# Patient Record
Sex: FEMALE | Race: BLACK OR AFRICAN AMERICAN | Employment: UNEMPLOYED | URBAN - METROPOLITAN AREA
[De-identification: names, ages, dates, MRNs, and addresses within clinical notes are randomized per-mention and may not be internally consistent; named-entity substitution may affect disease eponyms.]

---

## 2019-07-03 ENCOUNTER — HOSPITAL ENCOUNTER (EMERGENCY)
Age: 19
Discharge: HOME | End: 2019-07-04
Attending: EMERGENCY MEDICINE
Payer: COMMERCIAL

## 2019-07-03 DIAGNOSIS — M54.50 ACUTE RIGHT-SIDED LOW BACK PAIN WITHOUT SCIATICA: Primary | ICD-10-CM

## 2019-07-03 DIAGNOSIS — Z3A.18 18 WEEKS GESTATION OF PREGNANCY: ICD-10-CM

## 2019-07-03 PROCEDURE — 83735 ASSAY OF MAGNESIUM: CPT

## 2019-07-03 PROCEDURE — 80053 COMPREHEN METABOLIC PANEL: CPT

## 2019-07-03 PROCEDURE — 81003 URINALYSIS AUTO W/O SCOPE: CPT

## 2019-07-03 PROCEDURE — 85025 COMPLETE CBC W/AUTO DIFF WBC: CPT

## 2019-07-03 PROCEDURE — 99283 EMERGENCY DEPT VISIT LOW MDM: CPT

## 2019-07-03 SDOH — HEALTH STABILITY: MENTAL HEALTH: HOW OFTEN DO YOU HAVE A DRINK CONTAINING ALCOHOL?: NEVER

## 2019-07-03 ASSESSMENT — PAIN SCALES - GENERAL: PAINLEVEL_OUTOF10: 7

## 2019-07-04 VITALS
OXYGEN SATURATION: 100 % | WEIGHT: 132 LBS | BODY MASS INDEX: 21.99 KG/M2 | HEIGHT: 65 IN | RESPIRATION RATE: 16 BRPM | SYSTOLIC BLOOD PRESSURE: 110 MMHG | DIASTOLIC BLOOD PRESSURE: 82 MMHG | TEMPERATURE: 98.1 F | HEART RATE: 75 BPM

## 2019-07-04 LAB
A/G RATIO: 1 (ref 1.1–2.2)
ALBUMIN SERPL-MCNC: 3.3 G/DL (ref 3.4–5)
ALP BLD-CCNC: 54 U/L (ref 40–129)
ALT SERPL-CCNC: 14 U/L (ref 10–40)
ANION GAP SERPL CALCULATED.3IONS-SCNC: 11 MMOL/L (ref 3–16)
AST SERPL-CCNC: 19 U/L (ref 15–37)
BASOPHILS ABSOLUTE: 0 K/UL (ref 0–0.2)
BASOPHILS RELATIVE PERCENT: 0.4 %
BILIRUB SERPL-MCNC: 0.5 MG/DL (ref 0–1)
BILIRUBIN URINE: NEGATIVE
BLOOD, URINE: NEGATIVE
BUN BLDV-MCNC: 7 MG/DL (ref 7–20)
CALCIUM SERPL-MCNC: 8.7 MG/DL (ref 8.3–10.6)
CHLORIDE BLD-SCNC: 104 MMOL/L (ref 99–110)
CLARITY: CLEAR
CO2: 22 MMOL/L (ref 21–32)
COLOR: YELLOW
CREAT SERPL-MCNC: 0.5 MG/DL (ref 0.6–1.1)
EOSINOPHILS ABSOLUTE: 0.1 K/UL (ref 0–0.6)
EOSINOPHILS RELATIVE PERCENT: 1.3 %
GFR AFRICAN AMERICAN: >60
GFR NON-AFRICAN AMERICAN: >60
GLOBULIN: 3.3 G/DL
GLUCOSE BLD-MCNC: 84 MG/DL (ref 70–99)
GLUCOSE URINE: NEGATIVE MG/DL
HCT VFR BLD CALC: 31 % (ref 36–48)
HEMOGLOBIN: 10.1 G/DL (ref 12–16)
KETONES, URINE: NEGATIVE MG/DL
LEUKOCYTE ESTERASE, URINE: NEGATIVE
LYMPHOCYTES ABSOLUTE: 1.9 K/UL (ref 1–5.1)
LYMPHOCYTES RELATIVE PERCENT: 26.4 %
MAGNESIUM: 1.8 MG/DL (ref 1.8–2.4)
MCH RBC QN AUTO: 23.8 PG (ref 26–34)
MCHC RBC AUTO-ENTMCNC: 32.6 G/DL (ref 31–36)
MCV RBC AUTO: 73 FL (ref 80–100)
MICROSCOPIC EXAMINATION: NORMAL
MONOCYTES ABSOLUTE: 0.6 K/UL (ref 0–1.3)
MONOCYTES RELATIVE PERCENT: 8.6 %
NEUTROPHILS ABSOLUTE: 4.6 K/UL (ref 1.7–7.7)
NEUTROPHILS RELATIVE PERCENT: 63.3 %
NITRITE, URINE: NEGATIVE
PDW BLD-RTO: 16.1 % (ref 12.4–15.4)
PH UA: 6.5 (ref 5–8)
PLATELET # BLD: 208 K/UL (ref 135–450)
PMV BLD AUTO: 9.3 FL (ref 5–10.5)
POTASSIUM REFLEX MAGNESIUM: 3.5 MMOL/L (ref 3.5–5.1)
PROTEIN UA: NEGATIVE MG/DL
RBC # BLD: 4.25 M/UL (ref 4–5.2)
SODIUM BLD-SCNC: 137 MMOL/L (ref 136–145)
SPECIFIC GRAVITY UA: <1.005 (ref 1–1.03)
TOTAL PROTEIN: 6.6 G/DL (ref 6.4–8.2)
URINE REFLEX TO CULTURE: NORMAL
URINE TYPE: NORMAL
UROBILINOGEN, URINE: 0.2 E.U./DL
WBC # BLD: 7.3 K/UL (ref 4–11)

## 2019-07-04 ASSESSMENT — ENCOUNTER SYMPTOMS
DIARRHEA: 0
SHORTNESS OF BREATH: 0
NAUSEA: 0
VOMITING: 0
BACK PAIN: 1
ABDOMINAL PAIN: 0

## 2019-07-04 NOTE — ED PROVIDER NOTES
[facility]        Pt Name: Bushra Noland  MRN: [id number]  [place] 2000  Date of evaluation: 7/3/2019  Provider: Ivonne Watts  PCP: Referring Not In System (Inactive)    This patient was seen and evaluated by the attending physician Endy Mary MD.      [address] [place] [address]       Chief Complaint   Patient presents with    Flank Pain     R sided flank pain that radiates to lower back for the past few hours, denies N/V/D or urinary symptoms, denies vaginal bleeding       HISTORY OF PRESENT ILLNESS   (Location/Symptom, Timing/Onset, Context/Setting, Quality, Duration, Modifying Factors, Severity)  Note limiting factors. Bushra Noland is a 22 y.o. female patient presents emergency department for evaluation of right lower back pain. Patient states this started approximately few hours ago. Patient also states she has some discomfort to her lower abdomen that goes from her left hip across to her right hip. Patient denies any nausea, vomiting, diarrhea or pain with urination. She denies any abnormal vaginal bleeding or discharge. Patient denies any fevers at home. Patient states she is approximately 18 weeks pregnant and does have positive fetal movement. Nursing Notes were all reviewed and agreed with or any disagreements were addressed  in the HPI. REVIEW OF SYSTEMS    (2-9 systems for level 4, 10 or more for level 5)     Review of Systems   Constitutional: Negative for fatigue and fever. HENT: Negative. Eyes: Negative for visual disturbance. Respiratory: Negative for shortness of breath. Cardiovascular: Negative for chest pain. Gastrointestinal: Negative for abdominal pain, diarrhea, nausea and vomiting. Genitourinary: Negative for dysuria. Musculoskeletal: Positive for back pain. Neurological: Negative for dizziness, light-headedness and headaches.        Positives and Pertinent negatives as per CULTURE    Narrative:     Performed at:  [facility]  [address],  [place], [facility]   Phone [phone]   MAGNESIUM    Narrative:     Performed at:  [facility]  [address], [address]   Phone [phone]       All other labs were within normal range or not returned as of this dictation. EKG: All EKG's are interpreted by the Emergency Department Physician in the absence of a cardiologist.  Please see their note for interpretation of EKG. RADIOLOGY:   Non-plain film images such as CT, Ultrasound and MRI are read by the radiologist. Plain radiographic images are visualized andpreliminarily interpreted by the  ED Provider with the below findings:        Interpretation perthe Radiologist below, if available at the time of this note:    No orders to display     No results found. PROCEDURES   Unless otherwise noted below, none     Procedures    CRITICAL CARE TIME   N/A    CONSULTS:  None      EMERGENCY DEPARTMENT COURSE and DIFFERENTIAL DIAGNOSIS/MDM:   Vitals:    Vitals:    07/03/19 2239 07/04/19 0100   BP: 124/79 110/82   Pulse: 100 75   Resp: 16 16   Temp: 98.1 °F (36.7 °C)    SpO2: 100% 100%   Weight: 132 lb (59.9 kg)    Height: 5' 5\" (1.651 m)        Patient was given thefollowing medications:  Medications - No data to display    Patient presents emergency department for evaluation of right lower back pain. Patient is alert and oriented no acute distress. Vitals are stable and she is afebrile. Patient has some tenderness to the right lower back musculature with palpation. Patient denies any saddle anesthesia, loss of bowel or bladder or numbness or tingling in her feet. Patient also has some discomfort noted to her extreme lower abdomen just below her developing gravid abdomen. There is no reproducible pain to palpation of her abdomen. Abdomen is soft and nontender. This is likely round ligament pain. Urinalysis shows no evidence of acute cystitis. Patient has no CVA tenderness. Patient's back pain is likely due to change in posture due to gravid abdomen. Patient was encouraged to use warm compresses as well as Tylenol for her discomfort. At this time I feel the patient is at low risk of intra-abdominal abnormality including appendicitis, ovarian torsion, vaginal infection, cauda equina, acute bony fracture or neurovascular injury. FINAL IMPRESSION      1.  Acute right-sided low back pain without sciatica    2. 18 weeks gestation of pregnancy          DISPOSITION/PLAN   DISPOSITION Decision To Discharge 07/04/2019 12:49:46 AM      PATIENT REFERREDTO:  [facility] Emergency Department  [address] [facility]  [address] [place] [address]  [phone]    If symptoms worsen      DISCHARGE MEDICATIONS:  Discharge Medication List as of 7/4/2019 12:57 AM          DISCONTINUED MEDICATIONS:  Discharge Medication List as of 7/4/2019 12:57 AM                 (Please note that portions ofthis note were completed with a voice recognition program.  Efforts were made to edit the dictations but occasionally words are mis-transcribed.)    Amelie Mason PA-C (electronically signed)            Amelie Mason PA-C  07/04/19 5141

## 2019-07-04 NOTE — ED NOTES
IV removed. VSS. Reviewed discharge instructions, home meds, and follow up care with patient, verbalized understanding.       Nolvia Santos RN  07/04/19 7444

## 2019-07-04 NOTE — ED NOTES
IV started without complications, pt tolerated well. Blood work collected and sent to lab. 22g in [facility]. UA collected and sent to lab. Fetal heart tones assessed at 153BPM. Pt denies further needs at this time, will continue to monitor.       Reece Ivy RN  07/04/19 0001

## 2019-08-12 LAB
GLUCOSE BLD-MCNC: 72 MG/DL (ref 70–99)
HCT VFR BLD CALC: 32.9 % (ref 36–48)
HEMOGLOBIN: 10.5 G/DL (ref 12–16)
MCH RBC QN AUTO: 23.9 PG (ref 26–34)
MCHC RBC AUTO-ENTMCNC: 31.8 G/DL (ref 31–36)
MCV RBC AUTO: 75 FL (ref 80–100)
PDW BLD-RTO: 16.1 % (ref 12.4–15.4)
PLATELET # BLD: 237 K/UL (ref 135–450)
PMV BLD AUTO: 8.8 FL (ref 5–10.5)
RBC # BLD: 4.39 M/UL (ref 4–5.2)
WBC # BLD: 7.5 K/UL (ref 4–11)

## 2019-08-21 ENCOUNTER — HOSPITAL ENCOUNTER (OUTPATIENT)
Age: 19
Setting detail: OBSERVATION
Discharge: HOME | End: 2019-08-21
Attending: OBSTETRICS & GYNECOLOGY | Admitting: OBSTETRICS & GYNECOLOGY
Payer: COMMERCIAL

## 2019-08-21 VITALS
HEART RATE: 88 BPM | DIASTOLIC BLOOD PRESSURE: 56 MMHG | TEMPERATURE: 97.9 F | SYSTOLIC BLOOD PRESSURE: 112 MMHG | RESPIRATION RATE: 16 BRPM

## 2019-08-21 LAB
BILIRUBIN URINE: NEGATIVE
BLOOD, URINE: NEGATIVE
CLARITY: CLEAR
COLOR: YELLOW
FETAL FIBRONECTIN: POSITIVE
GLUCOSE URINE: NEGATIVE MG/DL
KETONES, URINE: NEGATIVE MG/DL
LEUKOCYTE ESTERASE, URINE: NEGATIVE
MICROSCOPIC EXAMINATION: NORMAL
NITRITE, URINE: NEGATIVE
PH UA: 6.5 (ref 5–8)
PROTEIN UA: NEGATIVE MG/DL
SPECIFIC GRAVITY UA: 1.02 (ref 1–1.03)
URINE TYPE: NORMAL
UROBILINOGEN, URINE: 0.2 E.U./DL

## 2019-08-21 PROCEDURE — 81003 URINALYSIS AUTO W/O SCOPE: CPT

## 2019-08-21 PROCEDURE — G0378 HOSPITAL OBSERVATION PER HR: HCPCS

## 2019-08-21 PROCEDURE — G0379 DIRECT REFER HOSPITAL OBSERV: HCPCS

## 2019-08-21 PROCEDURE — 99211 OFF/OP EST MAY X REQ PHY/QHP: CPT

## 2019-08-21 NOTE — FLOWSHEET NOTE
Patient to triage for contractions. Denies bleeding or leaking fluid. Reports good fetal movement. EFM and contraction monitor applied. UA obtained.

## 2019-08-21 NOTE — FLOWSHEET NOTE
Dr. Amelie Borja notified of patient's [address]. Ordered bedside ultrasound and she will come evaluate.

## 2019-08-28 ENCOUNTER — HOSPITAL ENCOUNTER (OUTPATIENT)
Age: 19
Setting detail: SPECIMEN
Discharge: HOME | End: 2019-08-28
Payer: COMMERCIAL

## 2019-08-28 PROBLEM — O26.892 PELVIC PAIN AFFECTING PREGNANCY IN SECOND TRIMESTER, ANTEPARTUM: Status: ACTIVE | Noted: 2019-08-28

## 2019-08-28 PROBLEM — R10.2 PELVIC PAIN AFFECTING PREGNANCY IN SECOND TRIMESTER, ANTEPARTUM: Status: ACTIVE | Noted: 2019-08-28

## 2019-08-28 LAB
FETAL FIBRONECTIN: NEGATIVE
HEPATITIS C ANTIBODY INTERPRETATION: NORMAL
RUBELLA ANTIBODY IGG: 327.1 IU/ML

## 2019-08-28 PROCEDURE — 82731 ASSAY OF FETAL FIBRONECTIN: CPT

## 2019-08-29 LAB — TOTAL SYPHILLIS IGG/IGM: NORMAL

## 2019-09-28 LAB
HEPATITIS C ANTIBODY, EXTERNAL RESULT: NORMAL
RUBELLA TITER, EXTERNAL RESULT: 327.1

## 2019-11-02 LAB — GBS, EXTERNAL RESULT: NEGATIVE

## 2019-11-18 ENCOUNTER — HOSPITAL ENCOUNTER (OUTPATIENT)
Age: 19
Discharge: HOME | End: 2019-11-18
Attending: OBSTETRICS & GYNECOLOGY | Admitting: OBSTETRICS & GYNECOLOGY
Payer: MEDICARE

## 2019-11-18 VITALS — RESPIRATION RATE: 18 BRPM | WEIGHT: 160 LBS | HEIGHT: 65 IN | BODY MASS INDEX: 26.66 KG/M2

## 2019-11-18 PROBLEM — O47.9 THREATENED LABOR: Status: ACTIVE | Noted: 2019-11-18

## 2019-11-18 PROCEDURE — 99211 OFF/OP EST MAY X REQ PHY/QHP: CPT

## 2019-11-19 PROCEDURE — 99211 OFF/OP EST MAY X REQ PHY/QHP: CPT

## 2019-11-20 ENCOUNTER — ANESTHESIA EVENT (OUTPATIENT)
Dept: LABOR AND DELIVERY | Age: 19
End: 2019-11-20

## 2019-11-20 ENCOUNTER — ANESTHESIA (OUTPATIENT)
Dept: LABOR AND DELIVERY | Age: 19
End: 2019-11-20

## 2019-11-20 ENCOUNTER — HOSPITAL ENCOUNTER (INPATIENT)
Age: 19
LOS: 2 days | Discharge: HOME | End: 2019-11-22
Attending: OBSTETRICS & GYNECOLOGY | Admitting: OBSTETRICS & GYNECOLOGY
Payer: COMMERCIAL

## 2019-11-20 LAB
AMPHETAMINE SCREEN, URINE: NORMAL
BARBITURATE SCREEN URINE: NORMAL
BENZODIAZEPINE SCREEN, URINE: NORMAL
BUPRENORPHINE URINE: NORMAL
CANNABINOID SCREEN URINE: NORMAL
COCAINE METABOLITE SCREEN URINE: NORMAL
HCT VFR BLD CALC: 38.3 % (ref 36–48)
HEMOGLOBIN: 12.5 G/DL (ref 12–16)
Lab: NORMAL
MCH RBC QN AUTO: 25 PG (ref 26–34)
MCHC RBC AUTO-ENTMCNC: 32.6 G/DL (ref 31–36)
MCV RBC AUTO: 76.7 FL (ref 80–100)
METHADONE SCREEN, URINE: NORMAL
OPIATE SCREEN URINE: NORMAL
OXYCODONE URINE: NORMAL
PDW BLD-RTO: 16.2 % (ref 12.4–15.4)
PH UA: 7
PHENCYCLIDINE SCREEN URINE: NORMAL
PLATELET # BLD: 197 K/UL (ref 135–450)
PMV BLD AUTO: 9.9 FL (ref 5–10.5)
PROPOXYPHENE SCREEN: NORMAL
RBC # BLD: 4.99 M/UL (ref 4–5.2)
SPECIMEN STATUS: NORMAL
WBC # BLD: 11 K/UL (ref 4–11)

## 2019-11-20 PROCEDURE — 0UQMXZZ REPAIR VULVA, EXTERNAL APPROACH: ICD-10-PCS | Performed by: OBSTETRICS & GYNECOLOGY

## 2019-11-20 PROCEDURE — 85027 COMPLETE CBC AUTOMATED: CPT

## 2019-11-20 PROCEDURE — 6360000002 HC RX W HCPCS: Performed by: OBSTETRICS & GYNECOLOGY

## 2019-11-20 PROCEDURE — 80307 DRUG TEST PRSMV CHEM ANLYZR: CPT

## 2019-11-20 PROCEDURE — 0HQ9XZZ REPAIR PERINEUM SKIN, EXTERNAL APPROACH: ICD-10-PCS | Performed by: OBSTETRICS & GYNECOLOGY

## 2019-11-20 PROCEDURE — 1220000000 HC SEMI PRIVATE OB R&B

## 2019-11-20 PROCEDURE — 2580000003 HC RX 258: Performed by: OBSTETRICS & GYNECOLOGY

## 2019-11-20 PROCEDURE — 10907ZC DRAINAGE OF AMNIOTIC FLUID, THERAPEUTIC FROM PRODUCTS OF CONCEPTION, VIA NATURAL OR ARTIFICIAL OPENING: ICD-10-PCS | Performed by: OBSTETRICS & GYNECOLOGY

## 2019-11-20 PROCEDURE — 7200000001 HC VAGINAL DELIVERY

## 2019-11-20 PROCEDURE — 86780 TREPONEMA PALLIDUM: CPT

## 2019-11-20 RX ORDER — ACYCLOVIR 200 MG/1
200 CAPSULE ORAL PRN
COMMUNITY

## 2019-11-20 RX ORDER — LIDOCAINE HYDROCHLORIDE 10 MG/ML
30 INJECTION, SOLUTION EPIDURAL; INFILTRATION; INTRACAUDAL; PERINEURAL PRN
Status: DISCONTINUED | OUTPATIENT
Start: 2019-11-20 | End: 2019-11-21

## 2019-11-20 RX ORDER — SODIUM CHLORIDE 0.9 % (FLUSH) 0.9 %
10 SYRINGE (ML) INJECTION EVERY 12 HOURS SCHEDULED
Status: DISCONTINUED | OUTPATIENT
Start: 2019-11-20 | End: 2019-11-21

## 2019-11-20 RX ORDER — OXYCODONE HYDROCHLORIDE 5 MG/1
5 TABLET ORAL EVERY 6 HOURS PRN
Qty: 12 TABLET | Refills: 0 | Status: SHIPPED | OUTPATIENT
Start: 2019-11-20 | End: 2019-11-23

## 2019-11-20 RX ORDER — ONDANSETRON 2 MG/ML
4 INJECTION INTRAMUSCULAR; INTRAVENOUS EVERY 6 HOURS PRN
Status: DISCONTINUED | OUTPATIENT
Start: 2019-11-20 | End: 2019-11-21

## 2019-11-20 RX ORDER — IBUPROFEN 800 MG/1
800 TABLET ORAL EVERY 6 HOURS PRN
Qty: 40 TABLET | Refills: 0 | Status: SHIPPED | OUTPATIENT
Start: 2019-11-20 | End: 2020-08-19

## 2019-11-20 RX ORDER — ACETAMINOPHEN 500 MG
1000 TABLET ORAL EVERY 6 HOURS PRN
Qty: 30 TABLET | Refills: 0 | Status: SHIPPED | OUTPATIENT
Start: 2019-11-20 | End: 2020-08-19

## 2019-11-20 RX ORDER — SODIUM CHLORIDE, SODIUM LACTATE, POTASSIUM CHLORIDE, CALCIUM CHLORIDE 600; 310; 30; 20 MG/100ML; MG/100ML; MG/100ML; MG/100ML
INJECTION, SOLUTION INTRAVENOUS CONTINUOUS
Status: DISCONTINUED | OUTPATIENT
Start: 2019-11-20 | End: 2019-11-21

## 2019-11-20 RX ORDER — ERYTHROMYCIN 5 MG/G
OINTMENT OPHTHALMIC
Status: DISCONTINUED
Start: 2019-11-20 | End: 2019-11-21

## 2019-11-20 RX ORDER — TERBUTALINE SULFATE 1 MG/ML
0.25 INJECTION, SOLUTION SUBCUTANEOUS ONCE
Status: DISCONTINUED | OUTPATIENT
Start: 2019-11-20 | End: 2019-11-21

## 2019-11-20 RX ORDER — SODIUM CHLORIDE 0.9 % (FLUSH) 0.9 %
10 SYRINGE (ML) INJECTION PRN
Status: DISCONTINUED | OUTPATIENT
Start: 2019-11-20 | End: 2019-11-21

## 2019-11-20 RX ORDER — DOCUSATE SODIUM 100 MG/1
100 CAPSULE, LIQUID FILLED ORAL 2 TIMES DAILY
Status: DISCONTINUED | OUTPATIENT
Start: 2019-11-20 | End: 2019-11-21

## 2019-11-20 RX ADMIN — SODIUM CHLORIDE, POTASSIUM CHLORIDE, SODIUM LACTATE AND CALCIUM CHLORIDE: 600; 310; 30; 20 INJECTION, SOLUTION INTRAVENOUS at 19:35

## 2019-11-20 RX ADMIN — Medication 1 MILLI-UNITS/MIN: at 23:33

## 2019-11-20 ASSESSMENT — PAIN DESCRIPTION - DESCRIPTORS
DESCRIPTORS: CRAMPING
DESCRIPTORS: CRAMPING

## 2019-11-21 LAB — TOTAL SYPHILLIS IGG/IGM: NORMAL

## 2019-11-21 PROCEDURE — 90471 IMMUNIZATION ADMIN: CPT | Performed by: OBSTETRICS & GYNECOLOGY

## 2019-11-21 PROCEDURE — 6360000002 HC RX W HCPCS: Performed by: OBSTETRICS & GYNECOLOGY

## 2019-11-21 PROCEDURE — 6370000000 HC RX 637 (ALT 250 FOR IP)

## 2019-11-21 PROCEDURE — 1200000000 HC SEMI PRIVATE

## 2019-11-21 PROCEDURE — 90715 TDAP VACCINE 7 YRS/> IM: CPT | Performed by: OBSTETRICS & GYNECOLOGY

## 2019-11-21 PROCEDURE — 6370000000 HC RX 637 (ALT 250 FOR IP): Performed by: OBSTETRICS & GYNECOLOGY

## 2019-11-21 RX ORDER — OXYCODONE HYDROCHLORIDE AND ACETAMINOPHEN 5; 325 MG/1; MG/1
1 TABLET ORAL EVERY 4 HOURS PRN
Status: DISCONTINUED | OUTPATIENT
Start: 2019-11-21 | End: 2019-11-22 | Stop reason: HOSPADM

## 2019-11-21 RX ORDER — SIMETHICONE 80 MG
80 TABLET,CHEWABLE ORAL EVERY 6 HOURS PRN
Status: DISCONTINUED | OUTPATIENT
Start: 2019-11-21 | End: 2019-11-22 | Stop reason: HOSPADM

## 2019-11-21 RX ORDER — ONDANSETRON 2 MG/ML
4 INJECTION INTRAMUSCULAR; INTRAVENOUS EVERY 6 HOURS PRN
Status: DISCONTINUED | OUTPATIENT
Start: 2019-11-21 | End: 2019-11-22 | Stop reason: HOSPADM

## 2019-11-21 RX ORDER — DOCUSATE SODIUM 100 MG/1
100 CAPSULE, LIQUID FILLED ORAL 2 TIMES DAILY
Status: DISCONTINUED | OUTPATIENT
Start: 2019-11-21 | End: 2019-11-22 | Stop reason: HOSPADM

## 2019-11-21 RX ORDER — ACETAMINOPHEN 325 MG/1
650 TABLET ORAL EVERY 4 HOURS PRN
Status: DISCONTINUED | OUTPATIENT
Start: 2019-11-21 | End: 2019-11-22 | Stop reason: HOSPADM

## 2019-11-21 RX ORDER — FERROUS SULFATE 325(65) MG
325 TABLET ORAL DAILY
Status: DISCONTINUED | OUTPATIENT
Start: 2019-11-21 | End: 2019-11-22 | Stop reason: HOSPADM

## 2019-11-21 RX ORDER — IBUPROFEN 600 MG/1
600 TABLET ORAL EVERY 6 HOURS SCHEDULED
Status: DISCONTINUED | OUTPATIENT
Start: 2019-11-21 | End: 2019-11-22 | Stop reason: HOSPADM

## 2019-11-21 RX ORDER — ONDANSETRON 4 MG/1
4 TABLET, ORALLY DISINTEGRATING ORAL EVERY 6 HOURS PRN
Status: DISCONTINUED | OUTPATIENT
Start: 2019-11-21 | End: 2019-11-22 | Stop reason: HOSPADM

## 2019-11-21 RX ORDER — CARBOPROST TROMETHAMINE 250 UG/ML
250 INJECTION, SOLUTION INTRAMUSCULAR
Status: ACTIVE | OUTPATIENT
Start: 2019-11-21 | End: 2019-11-21

## 2019-11-21 RX ORDER — SODIUM CHLORIDE 0.9 % (FLUSH) 0.9 %
10 SYRINGE (ML) INJECTION EVERY 12 HOURS SCHEDULED
Status: DISCONTINUED | OUTPATIENT
Start: 2019-11-21 | End: 2019-11-22 | Stop reason: HOSPADM

## 2019-11-21 RX ORDER — METHYLERGONOVINE MALEATE 0.2 MG/ML
200 INJECTION INTRAVENOUS
Status: ACTIVE | OUTPATIENT
Start: 2019-11-21 | End: 2019-11-21

## 2019-11-21 RX ORDER — OXYCODONE HYDROCHLORIDE AND ACETAMINOPHEN 5; 325 MG/1; MG/1
2 TABLET ORAL EVERY 4 HOURS PRN
Status: DISCONTINUED | OUTPATIENT
Start: 2019-11-21 | End: 2019-11-22 | Stop reason: HOSPADM

## 2019-11-21 RX ORDER — SODIUM CHLORIDE 0.9 % (FLUSH) 0.9 %
10 SYRINGE (ML) INJECTION PRN
Status: DISCONTINUED | OUTPATIENT
Start: 2019-11-21 | End: 2019-11-22 | Stop reason: HOSPADM

## 2019-11-21 RX ORDER — FAMOTIDINE 20 MG/1
20 TABLET, FILM COATED ORAL 2 TIMES DAILY PRN
Status: DISCONTINUED | OUTPATIENT
Start: 2019-11-21 | End: 2019-11-22 | Stop reason: HOSPADM

## 2019-11-21 RX ORDER — SENNA AND DOCUSATE SODIUM 50; 8.6 MG/1; MG/1
1 TABLET, FILM COATED ORAL DAILY PRN
Status: DISCONTINUED | OUTPATIENT
Start: 2019-11-21 | End: 2019-11-22 | Stop reason: HOSPADM

## 2019-11-21 RX ORDER — LANOLIN 100 %
OINTMENT (GRAM) TOPICAL PRN
Status: DISCONTINUED | OUTPATIENT
Start: 2019-11-21 | End: 2019-11-22 | Stop reason: HOSPADM

## 2019-11-21 RX ORDER — MISOPROSTOL 100 UG/1
800 TABLET ORAL PRN
Status: DISCONTINUED | OUTPATIENT
Start: 2019-11-21 | End: 2019-11-22 | Stop reason: HOSPADM

## 2019-11-21 RX ADMIN — IBUPROFEN 600 MG: 600 TABLET, FILM COATED ORAL at 22:59

## 2019-11-21 RX ADMIN — DOCUSATE SODIUM 100 MG: 100 CAPSULE ORAL at 09:46

## 2019-11-21 RX ADMIN — ACETAMINOPHEN 650 MG: 325 TABLET, FILM COATED ORAL at 06:17

## 2019-11-21 RX ADMIN — DOCUSATE SODIUM 100 MG: 100 CAPSULE ORAL at 20:34

## 2019-11-21 RX ADMIN — IBUPROFEN 600 MG: 600 TABLET, FILM COATED ORAL at 17:31

## 2019-11-21 RX ADMIN — ACETAMINOPHEN 650 MG: 325 TABLET, FILM COATED ORAL at 23:07

## 2019-11-21 RX ADMIN — TETANUS TOXOID, REDUCED DIPHTHERIA TOXOID AND ACELLULAR PERTUSSIS VACCINE, ADSORBED 0.5 ML: 5; 2.5; 8; 8; 2.5 SUSPENSION INTRAMUSCULAR at 23:00

## 2019-11-21 RX ADMIN — BENZOCAINE AND LEVOMENTHOL: 200; 5 SPRAY TOPICAL at 01:28

## 2019-11-21 RX ADMIN — IBUPROFEN 600 MG: 600 TABLET, FILM COATED ORAL at 02:50

## 2019-11-21 RX ADMIN — IBUPROFEN 600 MG: 600 TABLET, FILM COATED ORAL at 09:46

## 2019-11-21 ASSESSMENT — PAIN SCALES - GENERAL
PAINLEVEL_OUTOF10: 5
PAINLEVEL_OUTOF10: 0
PAINLEVEL_OUTOF10: 3

## 2019-11-22 VITALS
RESPIRATION RATE: 18 BRPM | TEMPERATURE: 97.3 F | HEART RATE: 77 BPM | DIASTOLIC BLOOD PRESSURE: 57 MMHG | SYSTOLIC BLOOD PRESSURE: 105 MMHG

## 2019-11-22 PROCEDURE — 6370000000 HC RX 637 (ALT 250 FOR IP): Performed by: OBSTETRICS & GYNECOLOGY

## 2019-11-22 RX ADMIN — DOCUSATE SODIUM 100 MG: 100 CAPSULE ORAL at 09:03

## 2019-11-22 RX ADMIN — IBUPROFEN 600 MG: 600 TABLET, FILM COATED ORAL at 04:48

## 2019-11-22 RX ADMIN — IBUPROFEN 600 MG: 600 TABLET, FILM COATED ORAL at 12:47

## 2019-11-22 RX ADMIN — ACETAMINOPHEN 650 MG: 325 TABLET, FILM COATED ORAL at 10:10

## 2019-11-22 ASSESSMENT — PAIN SCALES - GENERAL
PAINLEVEL_OUTOF10: 5
PAINLEVEL_OUTOF10: 4
PAINLEVEL_OUTOF10: 4

## 2020-07-06 ENCOUNTER — TELEPHONE (OUTPATIENT)
Dept: FAMILY MEDICINE CLINIC | Age: 20
End: 2020-07-06

## 2020-07-09 ENCOUNTER — APPOINTMENT (OUTPATIENT)
Dept: CT IMAGING | Age: 20
End: 2020-07-09
Payer: COMMERCIAL

## 2020-07-09 ENCOUNTER — HOSPITAL ENCOUNTER (EMERGENCY)
Age: 20
Discharge: HOME | End: 2020-07-09
Payer: COMMERCIAL

## 2020-07-09 ENCOUNTER — APPOINTMENT (OUTPATIENT)
Dept: GENERAL RADIOLOGY | Age: 20
End: 2020-07-09
Payer: COMMERCIAL

## 2020-07-09 VITALS
RESPIRATION RATE: 18 BRPM | HEART RATE: 84 BPM | BODY MASS INDEX: 21.49 KG/M2 | WEIGHT: 128.97 LBS | HEIGHT: 65 IN | OXYGEN SATURATION: 100 % | TEMPERATURE: 98.8 F

## 2020-07-09 PROCEDURE — 70486 CT MAXILLOFACIAL W/O DYE: CPT

## 2020-07-09 PROCEDURE — 99284 EMERGENCY DEPT VISIT MOD MDM: CPT

## 2020-07-09 PROCEDURE — 70450 CT HEAD/BRAIN W/O DYE: CPT

## 2020-07-09 PROCEDURE — 70160 X-RAY EXAM OF NASAL BONES: CPT

## 2020-07-09 ASSESSMENT — PAIN DESCRIPTION - LOCATION
LOCATION: FACE

## 2020-07-09 ASSESSMENT — PAIN DESCRIPTION - PROGRESSION
CLINICAL_PROGRESSION: NOT CHANGED

## 2020-07-09 ASSESSMENT — PAIN DESCRIPTION - FREQUENCY
FREQUENCY: CONTINUOUS

## 2020-07-09 ASSESSMENT — PAIN DESCRIPTION - PAIN TYPE
TYPE: ACUTE PAIN

## 2020-07-09 ASSESSMENT — PAIN SCALES - GENERAL
PAINLEVEL_OUTOF10: 3

## 2020-07-09 ASSESSMENT — PAIN DESCRIPTION - DESCRIPTORS
DESCRIPTORS: ACHING

## 2020-07-09 ASSESSMENT — PAIN DESCRIPTION - ORIENTATION
ORIENTATION: ANTERIOR

## 2020-07-09 ASSESSMENT — PAIN DESCRIPTION - ONSET
ONSET: ON-GOING

## 2020-07-09 ASSESSMENT — PAIN - FUNCTIONAL ASSESSMENT
PAIN_FUNCTIONAL_ASSESSMENT: ACTIVITIES ARE NOT PREVENTED
PAIN_FUNCTIONAL_ASSESSMENT: ACTIVITIES ARE NOT PREVENTED
PAIN_FUNCTIONAL_ASSESSMENT: PREVENTS OR INTERFERES SOME ACTIVE ACTIVITIES AND ADLS

## 2020-07-09 NOTE — LETTER
[facility] Emergency Department  [address] [facility] [address]  Phone: [phone]               July 9, 2020    Patient: Diomedes Morales   YOB: 2000   Date of Visit: 7/9/2020       To Whom It May Concern:    Diomedes Morales was seen and treated in our emergency department on 7/9/2020. She may return to work on 7/10/2020.       Sincerely,       Juan Bentley RN         Signature:__________________________________

## 2020-07-09 NOTE — ED PROVIDER NOTES
[address] [facility]  [address] [address]  Dept: [phone]  Loc: [address] ENCOUNTER        This patient was not seen or evaluated by the attending physician. I evaluated this patient, the attending physician was available for consultation. CHIEF COMPLAINT    Chief Complaint   Patient presents with    Fall     pt tripped over son. pt it face on hard wood floor yesterday. no meds at home.  Otalgia     right ear; x 1 week. HPI    Huseyin Ketih is a 20 y.o. female who presents emergency department today with a one-week complaint of right ear pain. She states it is draining and crusting. It is painful to the touch. She denies body aches, fever, chills, headache, sore throat or cough. She states also yesterday she tripped over her son and hit her face onto the floor. She did not have loss of consciousness. She did not have any visual disturbances. She denies neck pain or any other injuries. She woke up today and she noticed some bruising beneath both of her eyes and her nasal bridge is a little swollen. She denies any lacerations or nose bleeding after the fall yesterday. REVIEW OF SYSTEMS    Neurologic: see HPI, No extremity weakness, no LOC  Musculoskeletal: see HPI  Cardiac: No chest pain or chest injury  Respiratory: No difficulty breathing  GI: No abdominal pain or abdominal injury  : No dysuria or hematuria  General: No fever  All other systems reviewed and are negative. PAST MEDICAL & SURGICAL HISTORY    Past Medical History:   Diagnosis Date    Anemia     Herpes simplex virus (HSV) infection     HSV (culture +) @ 27 wks      History reviewed. No pertinent surgical history.     CURRENT MEDICATIONS  (may include discharge medications prescribed in the ED)  Current Outpatient Rx   Medication Sig Dispense Refill    neomycin-polymyxin-hydrocortisone (CORTISPORIN) 3.5-32638-8 otic solution Place 4 drops into the right ear 3 times daily for 10 days Instill into right Ear 1 Bottle 0    acyclovir (ZOVIRAX) 200 MG capsule Take 200 mg by mouth daily      ibuprofen (ADVIL;MOTRIN) 800 MG tablet Take 1 tablet by mouth every 6 hours as needed for Pain 40 tablet 0    acetaminophen (AMINOFEN) 500 MG tablet Take 2 tablets by mouth every 6 hours as needed for Pain 30 tablet 0    Prenatal MV-Min-Fe Fum-FA-DHA (PRENATAL 1 PO) Take by mouth      ferrous sulfate (FLORIAN-DOMINGO) 325 (65 FE) MG tablet Take 1 tablet by mouth daily (with breakfast) 30 tablet 0       ALLERGIES    No Known Allergies    SOCIAL & FAMILY HISTORY    Social History     Socioeconomic History    Marital status: Single     Spouse name: None    Number of children: None    Years of education: None    Highest education level: None   Occupational History    None   Social Needs    Financial resource strain: None    Food insecurity     Worry: None     Inability: None    Transportation needs     Medical: None     Non-medical: None   Tobacco Use    Smoking status: Never Smoker    Smokeless tobacco: Never Used   Substance and Sexual Activity    Alcohol use: Not Currently     Frequency: Never    Drug use: Not Currently     Types: Marijuana    Sexual activity: Yes   Lifestyle    Physical activity     Days per week: None     Minutes per session: None    Stress: None   Relationships    Social connections     Talks on phone: None     Gets together: None     Attends Church service: None     Active member of club or organization: None     Attends meetings of clubs or organizations: None     Relationship status: None    Intimate partner violence     Fear of current or ex partner: None     Emotionally abused: None     Physically abused: None     Forced sexual activity: None   Other Topics Concern    None   Social History Narrative    None     Family History   Problem Relation Age of Onset    Anemia Mother    Marisa [place] Arrhythmia Mother     Early Death Mother     Heart Attack Mother     Heart Disease Mother     High Blood Pressure Mother     Kidney Disease Mother     Obesity Mother     Obesity Sister     Breast Cancer Paternal Aunt     Cancer Paternal Uncle     Early Death Maternal Grandmother        PHYSICAL EXAM    VITAL SIGNS: Pulse 84   Temp 98.8 °F (37.1 °C) (Oral)   Resp 18   Ht 5' 5\" (1.651 m)   Wt 128 lb 15.5 oz (58.5 kg)   SpO2 100%   BMI 21.46 kg/m²    Constitutional:  Well developed, well nourished, no acute distress   Scalp: no scalp hematoma, no palpable skull fractures  Neck: no posterior midline neck tenderness, no JVD   Eyes: Pupils equally round and react to light, extraocular movement are intact  HENT:  No trismus, airway patent, no hemotympanum. Nasal bridge has mild amount of soft tissue swelling with purple ecchymosis. The nasal bridge is straight. No septal hematoma or dried blood noted in the nares. The purple ecchymosis extends below both eyes. There is no soft tissue swelling to any other parts of the face. She has no pain or obvious deformity or crepitance with palpation to the orbital rims. Right ear exam is very painful. The tragus is mildly edematous and is painful with palpation. There is yellow crust in the ear canal.  No sign of perforation to the tympanic membrane. Neck is supple and non-meningismus without lymphadenopathy. Oropharynx is unremarkable.   Respiratory:  Lungs clear to auscultation bilaterally, no retractions   Cardiovascular:  regular rate, no murmurs  GI:  Soft, nontender, normal bowel sounds  Musculoskeletal:  no edema, no acute deformities  Vascular: Radial and DP pulses 2+ and equal bilaterally  Integument:  Well hydrated, no scalp laceration  Neurologic:  Awake, alert, oriented x4, no aphasia, no slurred speech, CN II-XII intact, normal finger to nose test bilaterally, 5/5 strength in all 4 extremities, sensation to light touch intact bilaterally, patellar reflexes 2+ and equal bilaterally  Psych: Pleasant affect, no hallucinations    RADIOLOGY    XR NASAL BONE (MIN 3 VIEWS )   Final Result   Right nasal bone fracture seen on CT today is not appreciated by this   modality. CT HEAD WO CONTRAST   Final Result   No acute intracranial abnormality. Right mastoid effusion         CT FACIAL BONES WO CONTRAST   Final Result   Minimally displaced nasal bone fracture. ED COURSE & MEDICAL DECISION MAKING    Pertinent Labs & Imaging studies reviewed and interpreted. (See chart for details)  Medications - No data to display    I have seen and evaluated this patient. My attending physician was available for consultation. Differential Diagnosis: epidural hematoma, subdural hematoma, parenchymal brain contusion or bleed, subarachnoid hemorrhage, skull fracture, neck fracture or dislocation, other. Neuro exam unremarkable. CT of the head and facial bones without contrast is interpreted by radiology show a minimally displaced nasal bone fracture and a right mastoid effusion. No acute intracranial abnormality. There are no lacerations noted to the nasal bridge. She is instructed on ice packs to the area but no longer than 15 minutes each time. She can follow-up with ENT. She will be placed on polymyxin eardrops and she was instructed to follow-up with her primary care physician in the next couple of days. She was instructed to return to the emergency department otherwise for worsening symptoms. Patient verbalized understanding of the discharge instructions. FINAL IMPRESSION    1. Acute swimmer's ear of right side    2.  Closed fracture of nasal bone, initial encounter        PLAN   Discharge with outpatient follow-up (see EMR)    (Please note that this note was completed with a voice recognition program.  Every attempt was made to edit the dictations, but inevitably there remain words that are mis-transcribed.)       Delmi Espinoza, FAISAL - CNP  07/10/20 8971

## 2020-07-09 NOTE — ED NOTES
Discharge and education instructions reviewed. Patient verbalized understanding, teach-back successful. Patient denied questions at this time. No acute distress noted. Patient instructed to follow-up as noted - return to emergency department if symptoms worsen. Patient verbalized understanding. Discharged per EDMD with discharged instructions.        Rogerio Grewal RN  07/09/20 0825

## 2020-07-16 ENCOUNTER — APPOINTMENT (OUTPATIENT)
Dept: GENERAL RADIOLOGY | Age: 20
End: 2020-07-16
Payer: COMMERCIAL

## 2020-07-16 ENCOUNTER — HOSPITAL ENCOUNTER (EMERGENCY)
Age: 20
Discharge: HOME | End: 2020-07-16
Payer: COMMERCIAL

## 2020-07-16 VITALS
WEIGHT: 126 LBS | HEART RATE: 78 BPM | BODY MASS INDEX: 20.99 KG/M2 | TEMPERATURE: 98.8 F | SYSTOLIC BLOOD PRESSURE: 133 MMHG | DIASTOLIC BLOOD PRESSURE: 84 MMHG | HEIGHT: 65 IN | OXYGEN SATURATION: 99 % | RESPIRATION RATE: 14 BRPM

## 2020-07-16 PROCEDURE — 73110 X-RAY EXAM OF WRIST: CPT

## 2020-07-16 PROCEDURE — 6370000000 HC RX 637 (ALT 250 FOR IP): Performed by: PHYSICIAN ASSISTANT

## 2020-07-16 PROCEDURE — 99283 EMERGENCY DEPT VISIT LOW MDM: CPT

## 2020-07-16 PROCEDURE — 73130 X-RAY EXAM OF HAND: CPT

## 2020-07-16 RX ORDER — NAPROXEN 500 MG/1
500 TABLET ORAL 2 TIMES DAILY WITH MEALS
Qty: 30 TABLET | Refills: 0 | Status: SHIPPED | OUTPATIENT
Start: 2020-07-16 | End: 2020-08-20

## 2020-07-16 RX ORDER — NAPROXEN 250 MG/1
500 TABLET ORAL ONCE
Status: COMPLETED | OUTPATIENT
Start: 2020-07-16 | End: 2020-07-16

## 2020-07-16 RX ORDER — HYDROCODONE BITARTRATE AND ACETAMINOPHEN 5; 325 MG/1; MG/1
2 TABLET ORAL ONCE
Status: COMPLETED | OUTPATIENT
Start: 2020-07-16 | End: 2020-07-16

## 2020-07-16 RX ADMIN — NAPROXEN 500 MG: 250 TABLET ORAL at 07:59

## 2020-07-16 RX ADMIN — HYDROCODONE BITARTRATE AND ACETAMINOPHEN 2 TABLET: 5; 325 TABLET ORAL at 07:59

## 2020-07-16 ASSESSMENT — PAIN DESCRIPTION - ORIENTATION: ORIENTATION: RIGHT

## 2020-07-16 ASSESSMENT — ENCOUNTER SYMPTOMS
NAUSEA: 0
COUGH: 0
SHORTNESS OF BREATH: 0
VOMITING: 0
ABDOMINAL PAIN: 0
CONSTIPATION: 0
RESPIRATORY NEGATIVE: 1
BACK PAIN: 0
COLOR CHANGE: 0
DIARRHEA: 0

## 2020-07-16 ASSESSMENT — PAIN SCALES - GENERAL: PAINLEVEL_OUTOF10: 9

## 2020-07-16 ASSESSMENT — PAIN DESCRIPTION - PAIN TYPE: TYPE: ACUTE PAIN

## 2020-07-16 ASSESSMENT — PAIN DESCRIPTION - LOCATION: LOCATION: HAND

## 2020-07-16 ASSESSMENT — PAIN DESCRIPTION - DESCRIPTORS: DESCRIPTORS: ACHING
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
## 2020-08-19 ENCOUNTER — APPOINTMENT (OUTPATIENT)
Dept: GENERAL RADIOLOGY | Age: 20
End: 2020-08-19
Payer: COMMERCIAL

## 2020-08-19 ENCOUNTER — HOSPITAL ENCOUNTER (EMERGENCY)
Age: 20
Discharge: HOME | End: 2020-08-19
Payer: COMMERCIAL

## 2020-08-19 VITALS
TEMPERATURE: 98.1 F | OXYGEN SATURATION: 98 % | BODY MASS INDEX: 20.09 KG/M2 | HEART RATE: 62 BPM | WEIGHT: 120.59 LBS | DIASTOLIC BLOOD PRESSURE: 86 MMHG | SYSTOLIC BLOOD PRESSURE: 126 MMHG | RESPIRATION RATE: 14 BRPM | HEIGHT: 65 IN

## 2020-08-19 LAB — HCG(URINE) PREGNANCY TEST: NEGATIVE

## 2020-08-19 PROCEDURE — 84703 CHORIONIC GONADOTROPIN ASSAY: CPT

## 2020-08-19 PROCEDURE — 6370000000 HC RX 637 (ALT 250 FOR IP): Performed by: PHYSICIAN ASSISTANT

## 2020-08-19 PROCEDURE — 29125 APPL SHORT ARM SPLINT STATIC: CPT

## 2020-08-19 PROCEDURE — 29105 APPLICATION LONG ARM SPLINT: CPT

## 2020-08-19 PROCEDURE — 99284 EMERGENCY DEPT VISIT MOD MDM: CPT | Performed by: ORTHOPAEDIC SURGERY

## 2020-08-19 PROCEDURE — 73110 X-RAY EXAM OF WRIST: CPT

## 2020-08-19 PROCEDURE — 72072 X-RAY EXAM THORAC SPINE 3VWS: CPT

## 2020-08-19 PROCEDURE — 72100 X-RAY EXAM L-S SPINE 2/3 VWS: CPT

## 2020-08-19 PROCEDURE — 73502 X-RAY EXAM HIP UNI 2-3 VIEWS: CPT

## 2020-08-19 PROCEDURE — 70110 X-RAY EXAM OF JAW 4/> VIEWS: CPT

## 2020-08-19 PROCEDURE — U0003 INFECTIOUS AGENT DETECTION BY NUCLEIC ACID (DNA OR RNA); SEVERE ACUTE RESPIRATORY SYNDROME CORONAVIRUS 2 (SARS-COV-2) (CORONAVIRUS DISEASE [COVID-19]), AMPLIFIED PROBE TECHNIQUE, MAKING USE OF HIGH THROUGHPUT TECHNOLOGIES AS DESCRIBED BY CMS-2020-01-R: HCPCS

## 2020-08-19 PROCEDURE — 99284 EMERGENCY DEPT VISIT MOD MDM: CPT

## 2020-08-19 RX ORDER — ACETAMINOPHEN 500 MG
1000 TABLET ORAL ONCE
Status: COMPLETED | OUTPATIENT
Start: 2020-08-19 | End: 2020-08-19

## 2020-08-19 RX ORDER — IBUPROFEN 600 MG/1
600 TABLET ORAL EVERY 6 HOURS PRN
Qty: 30 TABLET | Refills: 0 | Status: SHIPPED | OUTPATIENT
Start: 2020-08-19

## 2020-08-19 RX ORDER — ACETAMINOPHEN 500 MG
1000 TABLET ORAL EVERY 6 HOURS PRN
Qty: 40 TABLET | Refills: 0 | Status: SHIPPED | OUTPATIENT
Start: 2020-08-19

## 2020-08-19 RX ADMIN — IBUPROFEN 600 MG: 400 TABLET, FILM COATED ORAL at 10:12

## 2020-08-19 RX ADMIN — ACETAMINOPHEN 1000 MG: 500 TABLET, FILM COATED ORAL at 10:12

## 2020-08-19 ASSESSMENT — PAIN DESCRIPTION - ORIENTATION: ORIENTATION: RIGHT

## 2020-08-19 ASSESSMENT — ENCOUNTER SYMPTOMS
COUGH: 0
ABDOMINAL PAIN: 0
FACIAL SWELLING: 1
VOMITING: 0
NAUSEA: 0
SORE THROAT: 0
BACK PAIN: 1
SHORTNESS OF BREATH: 0

## 2020-08-19 ASSESSMENT — PAIN SCALES - GENERAL
PAINLEVEL_OUTOF10: 7

## 2020-08-19 ASSESSMENT — PAIN DESCRIPTION - PAIN TYPE: TYPE: ACUTE PAIN

## 2020-08-19 NOTE — CARE COORDINATION
SW consult:  SW met with patient and discussed if she would like a representative from [facility]) to respond to the ED and meet with her. Patient in agreement with [facility] coming to see her. SW called [facility], they will have a representative respond to [facility] within 45 minutes. SW available as needed.     12:30pm  [facility] rep called- she should arrive at Hospital around 1pm.

## 2020-08-19 NOTE — ED NOTES
Patient back from Xray and 2 family members were in the room. This RN in to ask the female visitor to leave since the man had been in prior. When this RN said she had to leave the female said \"it's all good he's leaving\"  This RN stated its 1 visitor per day with COVID and since the male hade already been in he was the only one aloud to stay. Female stated \"fuck no, he needs to go. \"  This RN told both visitors they needed to leave the room. The female yelled at the patient and asked why he was even here.   The patient stated \"I don't know\"     Kesha Pedroza, RN  08/19/20 2722

## 2020-08-19 NOTE — LETTER
[facility] Emergency Department  [address] [facility] [address]  Phone: [phone]               August 31, 2020    Patient: Sp Pedroza   YOB: 2000   Date of Visit: 8/19/2020       To Whom It May Concern:    Sp Pedroza was seen and treated in our emergency department on 8/19/2020.  She was accompanied by Mario Acosta.      Sincerely,                Signature:__________________________________

## 2020-08-19 NOTE — ED NOTES
Discharge and education instructions reviewed. Patient verbalized understanding, teach-back successful. Patient denied questions at this time. No acute distress noted. Patient instructed to follow-up as noted - return to emergency department if symptoms worsen. Patient verbalized understanding. Discharged per EDMD with discharge instructions.           Loletta Poster, FABY  08/19/20 2650

## 2020-08-19 NOTE — ED NOTES
Patient requested this RN get her boyfriend Rosa Queen from the lobby.   Rosa Queen back to the room     Sonam Hernandez RN  08/19/20 4250
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
## 2020-08-19 NOTE — ED NOTES
Sugar tong OCL placed to left arm. +PMS noted prior to and after splint placement. Medium sling applied to left arm as well.       Marizol Delgado  08/19/20 0137

## 2020-08-20 ENCOUNTER — TELEPHONE (OUTPATIENT)
Dept: ORTHOPEDIC SURGERY | Age: 20
End: 2020-08-20

## 2020-08-20 LAB — SARS-COV-2, NAA: NOT DETECTED

## 2020-08-20 RX ORDER — HYDROCODONE BITARTRATE AND ACETAMINOPHEN 5; 325 MG/1; MG/1
1 TABLET ORAL EVERY 6 HOURS PRN
Qty: 20 TABLET | Refills: 0 | Status: SHIPPED | OUTPATIENT
Start: 2020-08-20 | End: 2020-08-25

## 2020-08-20 NOTE — TELEPHONE ENCOUNTER
Patient called stating someone from Dr. Alvarenga Ice office called her but did not leave a message.  # [phone]

## 2020-08-20 NOTE — TELEPHONE ENCOUNTER
While discussing surgery details, pt states she is in a lot of pain. The tylenol and ibuprofen she was taking is not helping. Pt scheduled for 8.24.20 ulna fx. pls call [phone]

## 2020-08-20 NOTE — PROGRESS NOTES
C-Difficile admission screening and protocol:     * Admitted with diarrhea? n     *Prior history of C-Diff. In last 3 months?n     *Antibiotic use in the past 6-8 weeks?n     *Prior hospitalization or nursing home in the last month?n        [address] time_____615_______        Surgery time____________    Take the following medications with a sip of water:    Do not eat or drink anything after 12:00 midnight prior to your surgery. This includes water chewing gum, mints and ice chips. You may brush your teeth and gargle the morning of your surgery, but do not swallow the water     Please see your family doctor/pediatrician for a history and physical and/or concerning medications. Bring any test results/reports from your physicians office. If you are under the care of a heart doctor or specialist doctor, please be aware that you may be asked to them for clearance    You may be asked to stop blood thinners such as Coumadin, Plavix, Fragmin, Lovenox, etc., or any anti-inflammatories such as:  Aspirin, Ibuprofen, Advil, Naproxen prior to your surgery. We also ask that you stop any OTC medications such as fish oil, vitamin E, glucosamine, garlic, Multivitamins, COQ 10, etc.    We ask that you do not smoke 24 hours prior to surgery  We ask that you do not  drink any alcoholic beverages 24 hours prior to surgery     You must make arrangements for a responsible adult to take you home after your surgery. For your safety you will not be allowed to leave alone or drive yourself home. Your surgery will be cancelled if you do not have a ride home. Also for your safety, it is strongly suggested that someone stay with you the first 24 hours after your surgery. A parent or legal guardian must accompany a child scheduled for surgery and plan to stay at the hospital until the child is discharged. Please do not bring other children with you.     For your comfort, please wear simple loose fitting clothing to the hospital.  Please do not bring valuables. Do not wear any make-up or nail polish on your fingers or toes      For your safety, please do not wear any jewelry or body piercing's on the day of surgery. All jewelry must be removed. If you have dentures, they will be removed before going to operating room. For your convenience, we will provide you with a container. If you wear contact lenses or glasses, they will be removed, please bring a case for them. If you have a living will and a durable power of  for healthcare, please bring in a copy. As part of our patient safety program to minimize surgical site infections, we ask you to do the following:    · Please notify your surgeon if you develop any illness between         now and the  day of your surgery. · This includes a cough, cold, fever, sore throat, nausea,         or vomiting, and diarrhea, etc.  ·  Please notify your surgeon if you experience dizziness, shortness         of breath or blurred vision between now and the time of your surgery. Do not shave your operative site 96 hours prior to surgery. For face and neck surgery, men may use an electric razor 48 hours   prior to surgery. You may shower the night before surgery or the morning of   your surgery with an antibacterial soap. You will need to bring a photo ID and insurance card    [facility] has an onsite pharmacy, would you like to utilize our pharmacy     If you will be staying overnight and use a C-pap machine, please bring   your C-pap to hospital     Our goal is to provide you with excellent care, therefore, visitors will be limited to two(2) in the room at a time so that we may focus on providing this care for you. Please contact pre-admission testing if you have any further questions.                  [facility] phone number:  [phone] PAT fax number:  [phone]  Please note these are generalized instructions for all surgical cases, you may be provided with more specific instructions according to your surgery.

## 2020-08-20 NOTE — PROGRESS NOTES
Preoperative Screening for Elective Surgery/Invasive Procedures While COVID-19 present in the community     Have you tested positive or have been told to self-isolate for COVID-19 like symptoms within the past 28 days? n   Do you currently have any of the following symptoms? n  o Fever >100.0 F or 99.9 F in immunocompromised patients?n  o New onset cough, shortness of breath or difficulty breathing?n  o New onset sore throat, myalgia (muscle aches and pains), headache, loss of taste/smell or diarrhea?n   Have you had a potential exposure to COVID-19 within the past 14 days by:  o Close contact with a confirmed case?n  o Close contact with a healthcare worker,  or essential infrastructure worker (grocery store, [employer], gas station, public utilities or transportation)? YES  o Do you reside in a congregate setting such as; skilled nursing facility, adult home, correctional facility, homeless shelter or other institutional setting? no  o Have you had recent travel to a known COVID-19 hotspot? Pt resides [facility]    Indicate if the patient has a positive screen by answering yes to one or more of the above questions. Patients who test positive or screen positive prior to surgery or on the day of surgery should be evaluated in conjunction with the surgeon/proceduralist/anesthesiologist to determine the urgency of the procedure.

## 2020-08-21 ENCOUNTER — ANESTHESIA EVENT (OUTPATIENT)
Dept: OPERATING ROOM | Age: 20
End: 2020-08-21
Payer: COMMERCIAL

## 2020-08-24 ENCOUNTER — APPOINTMENT (OUTPATIENT)
Dept: GENERAL RADIOLOGY | Age: 20
End: 2020-08-24
Attending: ORTHOPAEDIC SURGERY
Payer: COMMERCIAL

## 2020-08-24 ENCOUNTER — HOSPITAL ENCOUNTER (OUTPATIENT)
Age: 20
Setting detail: OUTPATIENT SURGERY
Discharge: HOME | End: 2020-08-24
Attending: ORTHOPAEDIC SURGERY | Admitting: ORTHOPAEDIC SURGERY
Payer: COMMERCIAL

## 2020-08-24 ENCOUNTER — ANESTHESIA (OUTPATIENT)
Dept: OPERATING ROOM | Age: 20
End: 2020-08-24
Payer: COMMERCIAL

## 2020-08-24 VITALS
RESPIRATION RATE: 16 BRPM | DIASTOLIC BLOOD PRESSURE: 76 MMHG | SYSTOLIC BLOOD PRESSURE: 117 MMHG | HEIGHT: 65 IN | BODY MASS INDEX: 19.83 KG/M2 | WEIGHT: 119.05 LBS | TEMPERATURE: 97 F | HEART RATE: 56 BPM | OXYGEN SATURATION: 99 %

## 2020-08-24 VITALS
DIASTOLIC BLOOD PRESSURE: 79 MMHG | RESPIRATION RATE: 6 BRPM | TEMPERATURE: 94.6 F | SYSTOLIC BLOOD PRESSURE: 133 MMHG | OXYGEN SATURATION: 100 %

## 2020-08-24 LAB — PREGNANCY, URINE: NEGATIVE

## 2020-08-24 PROCEDURE — 3209999900 FLUORO FOR SURGICAL PROCEDURES

## 2020-08-24 PROCEDURE — 2709999900 HC NON-CHARGEABLE SUPPLY: Performed by: ORTHOPAEDIC SURGERY

## 2020-08-24 PROCEDURE — C1713 ANCHOR/SCREW BN/BN,TIS/BN: HCPCS | Performed by: ORTHOPAEDIC SURGERY

## 2020-08-24 PROCEDURE — 2580000003 HC RX 258: Performed by: ANESTHESIOLOGY

## 2020-08-24 PROCEDURE — 73090 X-RAY EXAM OF FOREARM: CPT

## 2020-08-24 PROCEDURE — 3600000014 HC SURGERY LEVEL 4 ADDTL 15MIN: Performed by: ORTHOPAEDIC SURGERY

## 2020-08-24 PROCEDURE — 84703 CHORIONIC GONADOTROPIN ASSAY: CPT

## 2020-08-24 PROCEDURE — 6360000002 HC RX W HCPCS: Performed by: ANESTHESIOLOGY

## 2020-08-24 PROCEDURE — 2720000010 HC SURG SUPPLY STERILE: Performed by: ORTHOPAEDIC SURGERY

## 2020-08-24 PROCEDURE — 7100000000 HC PACU RECOVERY - FIRST 15 MIN: Performed by: ORTHOPAEDIC SURGERY

## 2020-08-24 PROCEDURE — 7100000011 HC PHASE II RECOVERY - ADDTL 15 MIN: Performed by: ORTHOPAEDIC SURGERY

## 2020-08-24 PROCEDURE — 3600000004 HC SURGERY LEVEL 4 BASE: Performed by: ORTHOPAEDIC SURGERY

## 2020-08-24 PROCEDURE — 3700000000 HC ANESTHESIA ATTENDED CARE: Performed by: ORTHOPAEDIC SURGERY

## 2020-08-24 PROCEDURE — 6360000002 HC RX W HCPCS: Performed by: ORTHOPAEDIC SURGERY

## 2020-08-24 PROCEDURE — 2500000003 HC RX 250 WO HCPCS

## 2020-08-24 PROCEDURE — 2500000003 HC RX 250 WO HCPCS: Performed by: ORTHOPAEDIC SURGERY

## 2020-08-24 PROCEDURE — 6370000000 HC RX 637 (ALT 250 FOR IP): Performed by: ANESTHESIOLOGY

## 2020-08-24 PROCEDURE — 2580000003 HC RX 258: Performed by: ORTHOPAEDIC SURGERY

## 2020-08-24 PROCEDURE — 3700000001 HC ADD 15 MINUTES (ANESTHESIA): Performed by: ORTHOPAEDIC SURGERY

## 2020-08-24 PROCEDURE — 25545 OPTX ULNAR SHFT FX INT FIXJ: CPT | Performed by: NURSE PRACTITIONER

## 2020-08-24 PROCEDURE — 25545 OPTX ULNAR SHFT FX INT FIXJ: CPT | Performed by: ORTHOPAEDIC SURGERY

## 2020-08-24 PROCEDURE — C1769 GUIDE WIRE: HCPCS | Performed by: ORTHOPAEDIC SURGERY

## 2020-08-24 PROCEDURE — 6360000002 HC RX W HCPCS

## 2020-08-24 PROCEDURE — 7100000010 HC PHASE II RECOVERY - FIRST 15 MIN: Performed by: ORTHOPAEDIC SURGERY

## 2020-08-24 PROCEDURE — 7100000001 HC PACU RECOVERY - ADDTL 15 MIN: Performed by: ORTHOPAEDIC SURGERY

## 2020-08-24 DEVICE — BONE SCREW, FULLY THREADED, T8
Type: IMPLANTABLE DEVICE | Site: ULNA | Status: FUNCTIONAL
Brand: VARIAX

## 2020-08-24 DEVICE — BROAD LOCKING PLATE, 2.7
Type: IMPLANTABLE DEVICE | Site: ULNA | Status: FUNCTIONAL
Brand: VARIAX

## 2020-08-24 DEVICE — BONE SCREW, T8
Type: IMPLANTABLE DEVICE | Site: ULNA | Status: FUNCTIONAL
Brand: VARIAX

## 2020-08-24 RX ORDER — FENTANYL CITRATE 50 UG/ML
INJECTION, SOLUTION INTRAMUSCULAR; INTRAVENOUS PRN
Status: DISCONTINUED | OUTPATIENT
Start: 2020-08-24 | End: 2020-08-24 | Stop reason: SDUPTHER

## 2020-08-24 RX ORDER — DEXAMETHASONE SODIUM PHOSPHATE 4 MG/ML
INJECTION, SOLUTION INTRA-ARTICULAR; INTRALESIONAL; INTRAMUSCULAR; INTRAVENOUS; SOFT TISSUE PRN
Status: DISCONTINUED | OUTPATIENT
Start: 2020-08-24 | End: 2020-08-24 | Stop reason: SDUPTHER

## 2020-08-24 RX ORDER — OXYCODONE HYDROCHLORIDE AND ACETAMINOPHEN 5; 325 MG/1; MG/1
2 TABLET ORAL PRN
Status: COMPLETED | OUTPATIENT
Start: 2020-08-24 | End: 2020-08-24

## 2020-08-24 RX ORDER — LIDOCAINE HYDROCHLORIDE 20 MG/ML
INJECTION, SOLUTION EPIDURAL; INFILTRATION; INTRACAUDAL; PERINEURAL PRN
Status: DISCONTINUED | OUTPATIENT
Start: 2020-08-24 | End: 2020-08-24 | Stop reason: SDUPTHER

## 2020-08-24 RX ORDER — FENTANYL CITRATE 50 UG/ML
25 INJECTION, SOLUTION INTRAMUSCULAR; INTRAVENOUS EVERY 5 MIN PRN
Status: DISCONTINUED | OUTPATIENT
Start: 2020-08-24 | End: 2020-08-24 | Stop reason: HOSPADM

## 2020-08-24 RX ORDER — ONDANSETRON 2 MG/ML
4 INJECTION INTRAMUSCULAR; INTRAVENOUS
Status: COMPLETED | OUTPATIENT
Start: 2020-08-24 | End: 2020-08-24

## 2020-08-24 RX ORDER — MAGNESIUM HYDROXIDE 1200 MG/15ML
LIQUID ORAL CONTINUOUS PRN
Status: COMPLETED | OUTPATIENT
Start: 2020-08-24 | End: 2020-08-24

## 2020-08-24 RX ORDER — SODIUM CHLORIDE 0.9 % (FLUSH) 0.9 %
10 SYRINGE (ML) INJECTION EVERY 12 HOURS SCHEDULED
Status: DISCONTINUED | OUTPATIENT
Start: 2020-08-24 | End: 2020-08-24 | Stop reason: HOSPADM

## 2020-08-24 RX ORDER — SODIUM CHLORIDE 0.9 % (FLUSH) 0.9 %
10 SYRINGE (ML) INJECTION PRN
Status: DISCONTINUED | OUTPATIENT
Start: 2020-08-24 | End: 2020-08-24 | Stop reason: HOSPADM

## 2020-08-24 RX ORDER — ONDANSETRON 2 MG/ML
INJECTION INTRAMUSCULAR; INTRAVENOUS PRN
Status: DISCONTINUED | OUTPATIENT
Start: 2020-08-24 | End: 2020-08-24 | Stop reason: SDUPTHER

## 2020-08-24 RX ORDER — CEPHALEXIN 500 MG/1
500 CAPSULE ORAL 4 TIMES DAILY
Qty: 20 CAPSULE | Refills: 0 | Status: SHIPPED | OUTPATIENT
Start: 2020-08-24 | End: 2020-08-29

## 2020-08-24 RX ORDER — OXYCODONE HYDROCHLORIDE AND ACETAMINOPHEN 5; 325 MG/1; MG/1
1 TABLET ORAL PRN
Status: COMPLETED | OUTPATIENT
Start: 2020-08-24 | End: 2020-08-24

## 2020-08-24 RX ORDER — PROPOFOL 10 MG/ML
INJECTION, EMULSION INTRAVENOUS PRN
Status: DISCONTINUED | OUTPATIENT
Start: 2020-08-24 | End: 2020-08-24 | Stop reason: SDUPTHER

## 2020-08-24 RX ORDER — MIDAZOLAM HYDROCHLORIDE 1 MG/ML
INJECTION INTRAMUSCULAR; INTRAVENOUS PRN
Status: DISCONTINUED | OUTPATIENT
Start: 2020-08-24 | End: 2020-08-24 | Stop reason: SDUPTHER

## 2020-08-24 RX ORDER — SODIUM CHLORIDE 9 MG/ML
INJECTION, SOLUTION INTRAVENOUS CONTINUOUS
Status: DISCONTINUED | OUTPATIENT
Start: 2020-08-24 | End: 2020-08-24 | Stop reason: HOSPADM

## 2020-08-24 RX ORDER — BUPIVACAINE HYDROCHLORIDE 5 MG/ML
INJECTION, SOLUTION EPIDURAL; INTRACAUDAL
Status: COMPLETED | OUTPATIENT
Start: 2020-08-24 | End: 2020-08-24

## 2020-08-24 RX ADMIN — OXYCODONE HYDROCHLORIDE AND ACETAMINOPHEN 1 TABLET: 5; 325 TABLET ORAL at 13:10

## 2020-08-24 RX ADMIN — PROPOFOL 200 MG: 10 INJECTION, EMULSION INTRAVENOUS at 08:07

## 2020-08-24 RX ADMIN — HYDROMORPHONE HYDROCHLORIDE 0.5 MG: 1 INJECTION, SOLUTION INTRAMUSCULAR; INTRAVENOUS; SUBCUTANEOUS at 09:31

## 2020-08-24 RX ADMIN — LIDOCAINE HYDROCHLORIDE 80 MG: 20 INJECTION, SOLUTION EPIDURAL; INFILTRATION; INTRACAUDAL; PERINEURAL at 08:06

## 2020-08-24 RX ADMIN — MIDAZOLAM 2 MG: 1 INJECTION INTRAMUSCULAR; INTRAVENOUS at 08:03

## 2020-08-24 RX ADMIN — CEFAZOLIN SODIUM 2 G: 10 INJECTION, POWDER, FOR SOLUTION INTRAVENOUS at 08:03

## 2020-08-24 RX ADMIN — PROPOFOL 20 MG: 10 INJECTION, EMULSION INTRAVENOUS at 09:12

## 2020-08-24 RX ADMIN — FENTANYL CITRATE 100 MCG: 50 INJECTION INTRAMUSCULAR; INTRAVENOUS at 08:06

## 2020-08-24 RX ADMIN — ONDANSETRON 4 MG: 2 INJECTION INTRAMUSCULAR; INTRAVENOUS at 08:48

## 2020-08-24 RX ADMIN — PROPOFOL 50 MG: 10 INJECTION, EMULSION INTRAVENOUS at 09:09

## 2020-08-24 RX ADMIN — SODIUM CHLORIDE: 9 INJECTION, SOLUTION INTRAVENOUS at 07:13

## 2020-08-24 RX ADMIN — PROPOFOL 30 MG: 10 INJECTION, EMULSION INTRAVENOUS at 09:02

## 2020-08-24 RX ADMIN — DEXAMETHASONE SODIUM PHOSPHATE 8 MG: 4 INJECTION, SOLUTION INTRAMUSCULAR; INTRAVENOUS at 08:16

## 2020-08-24 RX ADMIN — HYDROMORPHONE HYDROCHLORIDE 0.5 MG: 1 INJECTION, SOLUTION INTRAMUSCULAR; INTRAVENOUS; SUBCUTANEOUS at 08:56

## 2020-08-24 RX ADMIN — ONDANSETRON 4 MG: 2 INJECTION INTRAMUSCULAR; INTRAVENOUS at 12:35

## 2020-08-24 ASSESSMENT — PAIN - FUNCTIONAL ASSESSMENT
PAIN_FUNCTIONAL_ASSESSMENT: 0-10
PAIN_FUNCTIONAL_ASSESSMENT: ACTIVITIES ARE NOT PREVENTED
PAIN_FUNCTIONAL_ASSESSMENT: ACTIVITIES ARE NOT PREVENTED
PAIN_FUNCTIONAL_ASSESSMENT: PREVENTS OR INTERFERES SOME ACTIVE ACTIVITIES AND ADLS
PAIN_FUNCTIONAL_ASSESSMENT: ACTIVITIES ARE NOT PREVENTED
PAIN_FUNCTIONAL_ASSESSMENT: ACTIVITIES ARE NOT PREVENTED
PAIN_FUNCTIONAL_ASSESSMENT: PREVENTS OR INTERFERES SOME ACTIVE ACTIVITIES AND ADLS

## 2020-08-24 ASSESSMENT — PULMONARY FUNCTION TESTS
PIF_VALUE: 0
PIF_VALUE: 2
PIF_VALUE: 15
PIF_VALUE: 12
PIF_VALUE: 2
PIF_VALUE: 6
PIF_VALUE: 1
PIF_VALUE: 1
PIF_VALUE: 13
PIF_VALUE: 17
PIF_VALUE: 6
PIF_VALUE: 2
PIF_VALUE: 7
PIF_VALUE: 2
PIF_VALUE: 12
PIF_VALUE: 16
PIF_VALUE: 15
PIF_VALUE: 16
PIF_VALUE: 8
PIF_VALUE: 2
PIF_VALUE: 15
PIF_VALUE: 1
PIF_VALUE: 7
PIF_VALUE: 0
PIF_VALUE: 15
PIF_VALUE: 1
PIF_VALUE: 16
PIF_VALUE: 15
PIF_VALUE: 13
PIF_VALUE: 16
PIF_VALUE: 13
PIF_VALUE: 12
PIF_VALUE: 16
PIF_VALUE: 5
PIF_VALUE: 1
PIF_VALUE: 16
PIF_VALUE: 13
PIF_VALUE: 2
PIF_VALUE: 0
PIF_VALUE: 1
PIF_VALUE: 13
PIF_VALUE: 7
PIF_VALUE: 5
PIF_VALUE: 6
PIF_VALUE: 13
PIF_VALUE: 6
PIF_VALUE: 16
PIF_VALUE: 14
PIF_VALUE: 5
PIF_VALUE: 1
PIF_VALUE: 13
PIF_VALUE: 16
PIF_VALUE: 13
PIF_VALUE: 2
PIF_VALUE: 0
PIF_VALUE: 2
PIF_VALUE: 13
PIF_VALUE: 19
PIF_VALUE: 13
PIF_VALUE: 12

## 2020-08-24 ASSESSMENT — PAIN DESCRIPTION - PAIN TYPE
TYPE: SURGICAL PAIN
TYPE: ACUTE PAIN
TYPE: SURGICAL PAIN

## 2020-08-24 ASSESSMENT — PAIN DESCRIPTION - DESCRIPTORS
DESCRIPTORS: DISCOMFORT
DESCRIPTORS: SHARP
DESCRIPTORS: DISCOMFORT
DESCRIPTORS: DISCOMFORT
DESCRIPTORS: SHARP
DESCRIPTORS: DISCOMFORT

## 2020-08-24 ASSESSMENT — PAIN DESCRIPTION - FREQUENCY
FREQUENCY: CONTINUOUS

## 2020-08-24 ASSESSMENT — PAIN DESCRIPTION - ORIENTATION
ORIENTATION: LEFT

## 2020-08-24 ASSESSMENT — PAIN DESCRIPTION - ONSET
ONSET: ON-GOING

## 2020-08-24 ASSESSMENT — PAIN DESCRIPTION - LOCATION
LOCATION: ARM

## 2020-08-24 ASSESSMENT — PAIN DESCRIPTION - PROGRESSION
CLINICAL_PROGRESSION: GRADUALLY IMPROVING
CLINICAL_PROGRESSION: NOT CHANGED

## 2020-08-24 ASSESSMENT — PAIN SCALES - GENERAL
PAINLEVEL_OUTOF10: 10
PAINLEVEL_OUTOF10: 10
PAINLEVEL_OUTOF10: 6
PAINLEVEL_OUTOF10: 10
PAINLEVEL_OUTOF10: 6

## 2020-08-24 ASSESSMENT — LIFESTYLE VARIABLES: SMOKING_STATUS: 0

## 2020-08-24 ASSESSMENT — ENCOUNTER SYMPTOMS: SHORTNESS OF BREATH: 0

## 2020-08-24 NOTE — PROGRESS NOTES
Awakens to voice, c/o pain, ok with transfer and po medicine, appears to rest/sleep when undisturbed
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
## 2020-08-24 NOTE — PROGRESS NOTES
When awakened she said she is nauseated. zofran given. Up in chair, sleeping. Oriented when awakened.

## 2020-08-24 NOTE — ANESTHESIA POSTPROCEDURE EVALUATION
Department of Anesthesiology  Postprocedure Note    Patient: Raymond Brito  MRN: [id number]  YOB: 2000  Date of evaluation: 8/24/2020  Time:  11:09 AM     Procedure Summary     Date:  08/24/20 Room / Location:  [address]    Anesthesia Start:  0803 Anesthesia Stop:  2392    Procedure:  OPEN REDUCTION INTERNAL FIXATION LEFT ULNA SHAFT FRACTURE (Left Arm Lower) Diagnosis:  (LEFT ULNA SHAFT FRACTURE)    Surgeon:  Hunter Mcclure MD Responsible Provider:  Claudeen Donovan, MD    Anesthesia Type:  general ASA Status:  2          Anesthesia Type: general    Roxana Phase I: Roxana Score: 9    Roxana Phase II:      Last vitals: Reviewed and per EMR flowsheets.        Anesthesia Post Evaluation    Patient location during evaluation: PACU  Patient participation: complete - patient participated  Level of consciousness: awake and alert  Airway patency: patent  Nausea & Vomiting: no nausea and no vomiting  Complications: no  Cardiovascular status: hemodynamically stable  Respiratory status: acceptable  Hydration status: stable

## 2020-08-24 NOTE — PROGRESS NOTES
Says pain pill is helping pain. Encouraged to go home and go to bed. She said she would. verbalized understanding of discharge instructions.
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
## 2020-08-24 NOTE — PROGRESS NOTES
patient has been sleeping. When awakened she says her pain is 10/10, then she falls right back to sleep. Dressing is clean dry intact. Fingers are warm, move well, charu well. Denies nausea.  Says she needs more time to rest.

## 2020-08-24 NOTE — PROGRESS NOTES
From PACU. Sleepy but oriented when awakened. C/o 10/10 surgical pain when asked,  Then she falls right back to sleep. Dressing is clean dry intact. Fingers are warm, move well, charu well.

## 2020-08-24 NOTE — PROGRESS NOTES
Ambulate to restroom with assistance. Up in chair now. C/o nausea. Says she needs to rest longer. Vss. When asked she said her pain is 10/10 and she falls back to sleep. Transportation not here for her yet.

## 2020-08-24 NOTE — BRIEF OP NOTE
Brief Postoperative Note      Patient: Juan Rodriguez  YOB: 2000  MRN: [id number]    Date of Procedure: 8/24/2020    Pre-Op Diagnosis: LEFT ULNA SHAFT FRACTURE    Post-Op Diagnosis: Same       Procedure(s):  OPEN REDUCTION INTERNAL FIXATION LEFT ULNA SHAFT FRACTURE    Surgeon(s):  Gary Mccarty MD    Assistant: Stephanie Allred CNP    Anesthesia: General    Estimated Blood Loss (mL): Minimal    Complications: None    Specimens:   * No specimens in log *    Implants:  Implant Name Type Inv. Item Serial No.  Lot No. LRB No. Used Action   PLATE BROAD LK 9.5HS LT Screw/Plate/Nail/Wyatt PLATE BROAD LK 0.8FK LT  [employer]: [employer]  Left 1 Implanted   SCREW NON LK 2.7X13MM Screw/Plate/Nail/Wyatt SCREW NON LK 2.7X13MM  [employer]: [employer]  Left 3 Implanted   SCREW T8 BONE 2. [id number] Screw/Plate/Nail/Wyatt SCREW T8 BONE 2. [id number]  [employer]: [employer]  Left 3 Implanted         Drains: * No LDAs found *    Findings: Same    Electronically signed by Gary Mccarty MD on 8/24/2020 at 11:35 AM

## 2020-08-24 NOTE — PROGRESS NOTES
Ride here now. Able to keep her eyes open. Denies nausea now. Says her pain is 10/10 and she wants one pain pill. Analgesic given. Tolerated sitting up and po fluids and cookies well.

## 2020-08-24 NOTE — PROGRESS NOTES
Awakens from spontaneous cough, restless, not follow commands, continuously repeats my arm hurts, eyes remain closed, attempt to reassure, medicating for pain

## 2020-08-25 ENCOUNTER — TELEPHONE (OUTPATIENT)
Dept: ORTHOPEDIC SURGERY | Age: 20
End: 2020-08-25
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
## 2020-08-26 RX ORDER — HYDROCODONE BITARTRATE AND ACETAMINOPHEN 5; 325 MG/1; MG/1
1 TABLET ORAL EVERY 6 HOURS PRN
Qty: 20 TABLET | Refills: 0 | Status: SHIPPED | OUTPATIENT
Start: 2020-08-26 | End: 2020-08-31

## 2020-08-26 NOTE — TELEPHONE ENCOUNTER
Called number listed [phone] (went straight to voicemail and unable to leave message)    Called patients father, he took message and stated he will have [address] call our office back for she had gotten a new phone number. Received message from on call service about patients pain.  Chuy Irizarrya like to follow up and discuss pain with her.  (please obtain patients new contact number)

## 2020-08-26 NOTE — TELEPHONE ENCOUNTER
Spoke with patient informing her it is normal to have increasing pain the first few days after surgery. To rest, ice, elevation to help with the pain, swelling, inflammation. And that if needed she can take acetaminophen as directed on the bottle in between the pain medicine dosages. Patient states she is out of her pain medication and would like a refill of norco sent to her [facility] on [facility].

## 2020-09-04 ENCOUNTER — TELEPHONE (OUTPATIENT)
Dept: ORTHOPEDIC SURGERY | Age: 20
End: 2020-09-04

## 2020-09-09 ENCOUNTER — OFFICE VISIT (OUTPATIENT)
Dept: ORTHOPEDIC SURGERY | Age: 20
End: 2020-09-09
Payer: COMMERCIAL

## 2020-09-09 VITALS — TEMPERATURE: 98.1 F | RESPIRATION RATE: 16 BRPM | HEIGHT: 65 IN | BODY MASS INDEX: 19.83 KG/M2 | WEIGHT: 119 LBS

## 2020-09-09 PROCEDURE — 99024 POSTOP FOLLOW-UP VISIT: CPT | Performed by: NURSE PRACTITIONER

## 2020-09-09 PROCEDURE — 99406 BEHAV CHNG SMOKING 3-10 MIN: CPT | Performed by: NURSE PRACTITIONER

## 2020-09-09 PROCEDURE — L3908 WHO COCK-UP NONMOLDE PRE OTS: HCPCS | Performed by: NURSE PRACTITIONER

## 2020-09-09 NOTE — PROGRESS NOTES
DIAGNOSIS:  Left ulnar shaft displaced fracture, status post ORIF. DATE OF SURGERY: 8/24/2020. HISTORY OF PRESENT ILLNESS:  Ms. Ivan Perez 23 y.o.  female right handed returns today  for 2 weeks postoperative visit. The patient denies any significant pain in the left wrist.  Rates pain a 2/10 VAS mild, aching, intermittent and are improving. Aggravating factors movement. Alleviating factors rest. No numbness or tingling sensation. No fever or Chills. She  is in a splint. She is a smoker. PHYSICAL EXAMINATION:  The incision is completely healed . No signs of any erythema or drainage. She  has no pain with the active or passive range of motion of the left wrist or elbow, but decreased ROM. She  has intact sensation, distally, and she  is neurovascularly intact. IMAGING:  Three views left radius and ulna taken today in the office showed anatomic alignment of left ulnar shaft, plate and screws in good position, no loosening. IMPRESSION:  2 weeks out from left ulnar shaft ORIF and doing very well. PLAN:  I have told the patient to work on ROM, as well as strengthening exercises. A removable forearm brace applied. No heavy impact activities. The patient will come back for a follow up in 6 weeks. At that time, we will take 3 views of the left wrist and ulna. PT if needed then. The patient smokes, and we discussed with the patient the risks of smoking on general health and also on bone and soft tissue healing (delay and non-union), and promised to cut down or stop smoking. Smoking: Educated the patient regarding the hazards of smoking and that it harms their body in many ways. It increases the chance of developing heart disease, lung disease, cancer, and other health problems including poor bone and wound healing. The importance of smoking cessation for optimal bone and wound healing was stressed. This was communicated verbally, 5 Minutes.       As this patient has demonstrated risk factors for osteoporosis, such as age greater than [de-identified] years and evidence of a fracture, I have referred the patient back to the primary care physician for evaluation for osteoporosis, including consideration for DEXA scanning, if this is felt to be clinically indicated. The patient is advised to contact the primary care physician to follow-up for further evaluation. Procedures    Nano Boo Titan Wrist Long Forearm Brace     Patient was prescribed a Nano Boo Titan Wrist and Forearm Brace. The left wrist will require stabilization / immobilization from this semi-rigid / rigid orthosis to improve their function. The orthosis will assist in protecting the affected area, provide functional support and facilitate healing. The patient was educated and fit by a healthcare professional with expert knowledge and specialization in brace application while under the direct supervision of the treating physician. Verbal and written instructions for the use of and application of this item were provided. They were instructed to contact the office immediately should the brace result in increased pain, decreased sensation, increased swelling or worsening of the condition.          Lima Pineda, APRN - CNP

## 2020-09-22 ENCOUNTER — TELEPHONE (OUTPATIENT)
Dept: ORTHOPEDIC SURGERY | Age: 20
End: 2020-09-22

## 2023-07-09 ENCOUNTER — HOSPITAL ENCOUNTER (EMERGENCY)
Age: 23
Discharge: HOME | DRG: 134 | End: 2023-07-10
Attending: EMERGENCY MEDICINE
Payer: COMMERCIAL

## 2023-07-09 VITALS
BODY MASS INDEX: 26.13 KG/M2 | WEIGHT: 157 LBS | DIASTOLIC BLOOD PRESSURE: 85 MMHG | OXYGEN SATURATION: 99 % | RESPIRATION RATE: 16 BRPM | TEMPERATURE: 99 F | SYSTOLIC BLOOD PRESSURE: 130 MMHG | HEART RATE: 101 BPM

## 2023-07-09 DIAGNOSIS — N10 ACUTE PYELONEPHRITIS: Primary | ICD-10-CM

## 2023-07-09 LAB
BACTERIA GENITAL QL WET PREP: NORMAL
BACTERIA URNS QL MICRO: ABNORMAL /HPF
BILIRUB UR QL STRIP.AUTO: NEGATIVE
CLARITY UR: CLEAR
CLUE CELLS SPEC QL WET PREP: NORMAL
COLOR UR: YELLOW
EPI CELLS #/AREA URNS HPF: ABNORMAL /HPF (ref 0–5)
EPI CELLS SPEC QL WET PREP: NORMAL
GLUCOSE UR STRIP.AUTO-MCNC: NEGATIVE MG/DL
HCG UR QL: NEGATIVE
HGB UR QL STRIP.AUTO: ABNORMAL
KETONES UR STRIP.AUTO-MCNC: NEGATIVE MG/DL
LEUKOCYTE ESTERASE UR QL STRIP.AUTO: NEGATIVE
MUCOUS THREADS #/AREA URNS LPF: ABNORMAL /LPF
NITRITE UR QL STRIP.AUTO: NEGATIVE
PH UR STRIP.AUTO: 5.5 [PH] (ref 5–8)
PROT UR STRIP.AUTO-MCNC: ABNORMAL MG/DL
RBC #/AREA URNS HPF: ABNORMAL /HPF (ref 0–4)
RBC SPEC QL WET PREP: NORMAL
SP GR UR STRIP.AUTO: >=1.03 (ref 1–1.03)
SPECIMEN SOURCE FLD: NORMAL
T VAGINALIS GENITAL QL WET PREP: NORMAL
UA COMPLETE W REFLEX CULTURE PNL UR: YES
UA DIPSTICK W REFLEX MICRO PNL UR: YES
URN SPEC COLLECT METH UR: ABNORMAL
UROBILINOGEN UR STRIP-ACNC: 0.2 E.U./DL
WBC #/AREA URNS HPF: ABNORMAL /HPF (ref 0–5)
WBC SPEC QL WET PREP: NORMAL
YEAST GENITAL QL WET PREP: NORMAL

## 2023-07-09 PROCEDURE — 99283 EMERGENCY DEPT VISIT LOW MDM: CPT

## 2023-07-09 PROCEDURE — 87086 URINE CULTURE/COLONY COUNT: CPT

## 2023-07-09 PROCEDURE — 87591 N.GONORRHOEAE DNA AMP PROB: CPT

## 2023-07-09 PROCEDURE — 81001 URINALYSIS AUTO W/SCOPE: CPT

## 2023-07-09 PROCEDURE — 87491 CHLMYD TRACH DNA AMP PROBE: CPT

## 2023-07-09 PROCEDURE — 84703 CHORIONIC GONADOTROPIN ASSAY: CPT

## 2023-07-09 PROCEDURE — 87210 SMEAR WET MOUNT SALINE/INK: CPT

## 2023-07-09 RX ORDER — OXYCODONE HYDROCHLORIDE AND ACETAMINOPHEN 5; 325 MG/1; MG/1
1-2 TABLET ORAL EVERY 6 HOURS PRN
Qty: 7 TABLET | Refills: 0 | Status: ON HOLD | OUTPATIENT
Start: 2023-07-09 | End: 2023-07-12 | Stop reason: HOSPADM

## 2023-07-09 RX ORDER — NAPROXEN 500 MG/1
500 TABLET ORAL 2 TIMES DAILY
Qty: 20 TABLET | Refills: 0 | Status: ON HOLD | OUTPATIENT
Start: 2023-07-09 | End: 2023-07-12 | Stop reason: HOSPADM

## 2023-07-09 RX ORDER — NITROFURANTOIN 25; 75 MG/1; MG/1
100 CAPSULE ORAL ONCE
Status: COMPLETED | OUTPATIENT
Start: 2023-07-10 | End: 2023-07-10

## 2023-07-09 RX ORDER — OXYCODONE HYDROCHLORIDE AND ACETAMINOPHEN 5; 325 MG/1; MG/1
1 TABLET ORAL ONCE
Status: COMPLETED | OUTPATIENT
Start: 2023-07-10 | End: 2023-07-10

## 2023-07-09 RX ORDER — NITROFURANTOIN 25; 75 MG/1; MG/1
100 CAPSULE ORAL 2 TIMES DAILY
Qty: 7 CAPSULE | Refills: 0 | Status: ON HOLD | OUTPATIENT
Start: 2023-07-09 | End: 2023-07-12 | Stop reason: HOSPADM

## 2023-07-09 ASSESSMENT — PAIN DESCRIPTION - PAIN TYPE: TYPE: ACUTE PAIN

## 2023-07-09 ASSESSMENT — PAIN DESCRIPTION - ORIENTATION: ORIENTATION: RIGHT

## 2023-07-09 ASSESSMENT — PAIN - FUNCTIONAL ASSESSMENT: PAIN_FUNCTIONAL_ASSESSMENT: 0-10

## 2023-07-09 ASSESSMENT — PAIN DESCRIPTION - DESCRIPTORS: DESCRIPTORS: SQUEEZING

## 2023-07-09 ASSESSMENT — PAIN DESCRIPTION - LOCATION: LOCATION: FLANK

## 2023-07-09 ASSESSMENT — PAIN SCALES - GENERAL: PAINLEVEL_OUTOF10: 8

## 2023-07-09 ASSESSMENT — PAIN DESCRIPTION - FREQUENCY: FREQUENCY: CONTINUOUS

## 2023-07-10 ENCOUNTER — APPOINTMENT (OUTPATIENT)
Dept: CT IMAGING | Age: 23
DRG: 134 | End: 2023-07-10
Payer: COMMERCIAL

## 2023-07-10 ENCOUNTER — HOSPITAL ENCOUNTER (INPATIENT)
Age: 23
LOS: 2 days | Discharge: HOME | DRG: 134 | End: 2023-07-12
Attending: EMERGENCY MEDICINE | Admitting: INTERNAL MEDICINE
Payer: COMMERCIAL

## 2023-07-10 ENCOUNTER — APPOINTMENT (OUTPATIENT)
Dept: GENERAL RADIOLOGY | Age: 23
DRG: 134 | End: 2023-07-10
Payer: COMMERCIAL

## 2023-07-10 DIAGNOSIS — I26.99 BILATERAL PULMONARY EMBOLISM (HCC): Primary | ICD-10-CM

## 2023-07-10 DIAGNOSIS — I26.99 PULMONARY EMBOLISM AND INFARCTION (HCC): ICD-10-CM

## 2023-07-10 LAB
ALBUMIN SERPL-MCNC: 3.6 G/DL (ref 3.4–5)
ALBUMIN/GLOB SERPL: 1.1 {RATIO} (ref 1.1–2.2)
ALP SERPL-CCNC: 45 U/L (ref 40–129)
ALT SERPL-CCNC: 14 U/L (ref 10–40)
ANION GAP SERPL CALCULATED.3IONS-SCNC: 9 MMOL/L (ref 3–16)
AST SERPL-CCNC: 15 U/L (ref 15–37)
BILIRUB SERPL-MCNC: 0.7 MG/DL (ref 0–1)
BUN SERPL-MCNC: 10 MG/DL (ref 7–20)
CALCIUM SERPL-MCNC: 8.8 MG/DL (ref 8.3–10.6)
CHLORIDE SERPL-SCNC: 106 MMOL/L (ref 99–110)
CO2 SERPL-SCNC: 22 MMOL/L (ref 21–32)
CREAT SERPL-MCNC: 0.6 MG/DL (ref 0.6–1.1)
DEPRECATED RDW RBC AUTO: 15.3 % (ref 12.4–15.4)
GFR SERPLBLD CREATININE-BSD FMLA CKD-EPI: >60 ML/MIN/{1.73_M2}
GLUCOSE SERPL-MCNC: 97 MG/DL (ref 70–99)
HCT VFR BLD AUTO: 33.8 % (ref 36–48)
HGB BLD-MCNC: 11.1 G/DL (ref 12–16)
LIPASE SERPL-CCNC: 21 U/L (ref 13–60)
MCH RBC QN AUTO: 23 PG (ref 26–34)
MCHC RBC AUTO-ENTMCNC: 32.9 G/DL (ref 31–36)
MCV RBC AUTO: 69.9 FL (ref 80–100)
PLATELET # BLD AUTO: 211 K/UL (ref 135–450)
PMV BLD AUTO: 9 FL (ref 5–10.5)
POTASSIUM SERPL-SCNC: 3.9 MMOL/L (ref 3.5–5.1)
PROT SERPL-MCNC: 7 G/DL (ref 6.4–8.2)
RBC # BLD AUTO: 4.83 M/UL (ref 4–5.2)
SODIUM SERPL-SCNC: 137 MMOL/L (ref 136–145)
TROPONIN, HIGH SENSITIVITY: <6 NG/L (ref 0–14)
WBC # BLD AUTO: 8.4 K/UL (ref 4–11)

## 2023-07-10 PROCEDURE — 99285 EMERGENCY DEPT VISIT HI MDM: CPT

## 2023-07-10 PROCEDURE — 85300 ANTITHROMBIN III ACTIVITY: CPT

## 2023-07-10 PROCEDURE — 6360000002 HC RX W HCPCS: Performed by: EMERGENCY MEDICINE

## 2023-07-10 PROCEDURE — 6370000000 HC RX 637 (ALT 250 FOR IP): Performed by: EMERGENCY MEDICINE

## 2023-07-10 PROCEDURE — 96374 THER/PROPH/DIAG INJ IV PUSH: CPT

## 2023-07-10 PROCEDURE — 85613 RUSSELL VIPER VENOM DILUTED: CPT

## 2023-07-10 PROCEDURE — 85610 PROTHROMBIN TIME: CPT

## 2023-07-10 PROCEDURE — 36415 COLL VENOUS BLD VENIPUNCTURE: CPT

## 2023-07-10 PROCEDURE — 81240 F2 GENE: CPT

## 2023-07-10 PROCEDURE — 2580000003 HC RX 258: Performed by: INTERNAL MEDICINE

## 2023-07-10 PROCEDURE — 85307 ASSAY ACTIVATED PROTEIN C: CPT

## 2023-07-10 PROCEDURE — 86147 CARDIOLIPIN ANTIBODY EA IG: CPT

## 2023-07-10 PROCEDURE — 81241 F5 GENE: CPT

## 2023-07-10 PROCEDURE — 83690 ASSAY OF LIPASE: CPT

## 2023-07-10 PROCEDURE — 85306 CLOT INHIBIT PROT S FREE: CPT

## 2023-07-10 PROCEDURE — 71260 CT THORAX DX C+: CPT

## 2023-07-10 PROCEDURE — 85303 CLOT INHIBIT PROT C ACTIVITY: CPT

## 2023-07-10 PROCEDURE — 74177 CT ABD & PELVIS W/CONTRAST: CPT

## 2023-07-10 PROCEDURE — 6360000004 HC RX CONTRAST MEDICATION: Performed by: EMERGENCY MEDICINE

## 2023-07-10 PROCEDURE — 83090 ASSAY OF HOMOCYSTEINE: CPT

## 2023-07-10 PROCEDURE — 80053 COMPREHEN METABOLIC PANEL: CPT

## 2023-07-10 PROCEDURE — 71045 X-RAY EXAM CHEST 1 VIEW: CPT

## 2023-07-10 PROCEDURE — 85027 COMPLETE CBC AUTOMATED: CPT

## 2023-07-10 PROCEDURE — 93005 ELECTROCARDIOGRAM TRACING: CPT | Performed by: INTERNAL MEDICINE

## 2023-07-10 PROCEDURE — 6360000002 HC RX W HCPCS: Performed by: INTERNAL MEDICINE

## 2023-07-10 PROCEDURE — 96372 THER/PROPH/DIAG INJ SC/IM: CPT

## 2023-07-10 PROCEDURE — 96375 TX/PRO/DX INJ NEW DRUG ADDON: CPT

## 2023-07-10 PROCEDURE — 84484 ASSAY OF TROPONIN QUANT: CPT

## 2023-07-10 PROCEDURE — 1200000000 HC SEMI PRIVATE

## 2023-07-10 PROCEDURE — 85730 THROMBOPLASTIN TIME PARTIAL: CPT

## 2023-07-10 PROCEDURE — 86146 BETA-2 GLYCOPROTEIN ANTIBODY: CPT

## 2023-07-10 RX ORDER — ONDANSETRON 4 MG/1
4 TABLET, ORALLY DISINTEGRATING ORAL EVERY 8 HOURS PRN
Status: DISCONTINUED | OUTPATIENT
Start: 2023-07-10 | End: 2023-07-12 | Stop reason: HOSPADM

## 2023-07-10 RX ORDER — OXYCODONE HYDROCHLORIDE AND ACETAMINOPHEN 5; 325 MG/1; MG/1
1 TABLET ORAL ONCE
Status: COMPLETED | OUTPATIENT
Start: 2023-07-10 | End: 2023-07-10

## 2023-07-10 RX ORDER — ACETAMINOPHEN 325 MG/1
650 TABLET ORAL EVERY 6 HOURS PRN
Status: DISCONTINUED | OUTPATIENT
Start: 2023-07-10 | End: 2023-07-12 | Stop reason: HOSPADM

## 2023-07-10 RX ORDER — ENOXAPARIN SODIUM 150 MG/ML
1.5 INJECTION SUBCUTANEOUS ONCE
Status: COMPLETED | OUTPATIENT
Start: 2023-07-10 | End: 2023-07-10

## 2023-07-10 RX ORDER — ACETAMINOPHEN 650 MG/1
650 SUPPOSITORY RECTAL EVERY 6 HOURS PRN
Status: DISCONTINUED | OUTPATIENT
Start: 2023-07-10 | End: 2023-07-12 | Stop reason: HOSPADM

## 2023-07-10 RX ORDER — ENOXAPARIN SODIUM 100 MG/ML
1 INJECTION SUBCUTANEOUS EVERY 12 HOURS
Status: DISCONTINUED | OUTPATIENT
Start: 2023-07-11 | End: 2023-07-12 | Stop reason: HOSPADM

## 2023-07-10 RX ORDER — ONDANSETRON 2 MG/ML
4 INJECTION INTRAMUSCULAR; INTRAVENOUS EVERY 6 HOURS PRN
Status: DISCONTINUED | OUTPATIENT
Start: 2023-07-10 | End: 2023-07-12 | Stop reason: HOSPADM

## 2023-07-10 RX ORDER — MORPHINE SULFATE 2 MG/ML
2 INJECTION, SOLUTION INTRAMUSCULAR; INTRAVENOUS EVERY 4 HOURS PRN
Status: DISCONTINUED | OUTPATIENT
Start: 2023-07-10 | End: 2023-07-11

## 2023-07-10 RX ORDER — SODIUM CHLORIDE 9 MG/ML
INJECTION, SOLUTION INTRAVENOUS PRN
Status: DISCONTINUED | OUTPATIENT
Start: 2023-07-10 | End: 2023-07-12 | Stop reason: HOSPADM

## 2023-07-10 RX ORDER — SODIUM CHLORIDE 9 MG/ML
INJECTION, SOLUTION INTRAVENOUS CONTINUOUS
Status: DISCONTINUED | OUTPATIENT
Start: 2023-07-10 | End: 2023-07-11

## 2023-07-10 RX ORDER — SODIUM CHLORIDE 0.9 % (FLUSH) 0.9 %
5-40 SYRINGE (ML) INJECTION EVERY 12 HOURS SCHEDULED
Status: DISCONTINUED | OUTPATIENT
Start: 2023-07-10 | End: 2023-07-12 | Stop reason: HOSPADM

## 2023-07-10 RX ORDER — KETOROLAC TROMETHAMINE 30 MG/ML
30 INJECTION, SOLUTION INTRAMUSCULAR; INTRAVENOUS EVERY 6 HOURS PRN
Status: DISCONTINUED | OUTPATIENT
Start: 2023-07-10 | End: 2023-07-11

## 2023-07-10 RX ORDER — ONDANSETRON 2 MG/ML
4 INJECTION INTRAMUSCULAR; INTRAVENOUS ONCE
Status: COMPLETED | OUTPATIENT
Start: 2023-07-10 | End: 2023-07-10

## 2023-07-10 RX ORDER — POLYETHYLENE GLYCOL 3350 17 G/17G
17 POWDER, FOR SOLUTION ORAL DAILY PRN
Status: DISCONTINUED | OUTPATIENT
Start: 2023-07-10 | End: 2023-07-12 | Stop reason: HOSPADM

## 2023-07-10 RX ORDER — SODIUM CHLORIDE 0.9 % (FLUSH) 0.9 %
5-40 SYRINGE (ML) INJECTION PRN
Status: DISCONTINUED | OUTPATIENT
Start: 2023-07-10 | End: 2023-07-12 | Stop reason: HOSPADM

## 2023-07-10 RX ADMIN — CEFTRIAXONE 1000 MG: 1 INJECTION, POWDER, FOR SOLUTION INTRAMUSCULAR; INTRAVENOUS at 18:53

## 2023-07-10 RX ADMIN — IOHEXOL 100 ML: 350 INJECTION, SOLUTION INTRAVENOUS at 06:19

## 2023-07-10 RX ADMIN — SODIUM CHLORIDE: 9 INJECTION, SOLUTION INTRAVENOUS at 14:40

## 2023-07-10 RX ADMIN — OXYCODONE AND ACETAMINOPHEN 1 TABLET: 5; 325 TABLET ORAL at 00:02

## 2023-07-10 RX ADMIN — ONDANSETRON 4 MG: 2 INJECTION INTRAMUSCULAR; INTRAVENOUS at 05:47

## 2023-07-10 RX ADMIN — MORPHINE SULFATE 2 MG: 2 INJECTION, SOLUTION INTRAMUSCULAR; INTRAVENOUS at 14:44

## 2023-07-10 RX ADMIN — NITROFURANTOIN MONOHYDRATE/MACROCRYSTALS 100 MG: 75; 25 CAPSULE ORAL at 00:02

## 2023-07-10 RX ADMIN — ENOXAPARIN SODIUM 105 MG: 150 INJECTION SUBCUTANEOUS at 08:16

## 2023-07-10 RX ADMIN — OXYCODONE AND ACETAMINOPHEN 1 TABLET: 5; 325 TABLET ORAL at 10:50

## 2023-07-10 RX ADMIN — KETOROLAC TROMETHAMINE 30 MG: 30 INJECTION, SOLUTION INTRAMUSCULAR; INTRAVENOUS at 22:16

## 2023-07-10 RX ADMIN — HYDROMORPHONE HYDROCHLORIDE 0.5 MG: 1 INJECTION, SOLUTION INTRAMUSCULAR; INTRAVENOUS; SUBCUTANEOUS at 05:47

## 2023-07-10 RX ADMIN — IOHEXOL 75 ML: 350 INJECTION, SOLUTION INTRAVENOUS at 06:55

## 2023-07-10 RX ADMIN — WATER 60 MG: 1 INJECTION INTRAMUSCULAR; INTRAVENOUS; SUBCUTANEOUS at 15:25

## 2023-07-10 ASSESSMENT — PAIN DESCRIPTION - PAIN TYPE
TYPE: ACUTE PAIN

## 2023-07-10 ASSESSMENT — PAIN SCALES - GENERAL
PAINLEVEL_OUTOF10: 8
PAINLEVEL_OUTOF10: 7
PAINLEVEL_OUTOF10: 8
PAINLEVEL_OUTOF10: 4
PAINLEVEL_OUTOF10: 0
PAINLEVEL_OUTOF10: 4

## 2023-07-10 ASSESSMENT — PAIN DESCRIPTION - FREQUENCY
FREQUENCY: CONTINUOUS
FREQUENCY: INTERMITTENT
FREQUENCY: CONTINUOUS

## 2023-07-10 ASSESSMENT — PAIN DESCRIPTION - ORIENTATION
ORIENTATION: RIGHT

## 2023-07-10 ASSESSMENT — PAIN DESCRIPTION - LOCATION
LOCATION: BACK
LOCATION: FLANK

## 2023-07-10 ASSESSMENT — PAIN - FUNCTIONAL ASSESSMENT
PAIN_FUNCTIONAL_ASSESSMENT: ACTIVITIES ARE NOT PREVENTED
PAIN_FUNCTIONAL_ASSESSMENT: 0-10
PAIN_FUNCTIONAL_ASSESSMENT: PREVENTS OR INTERFERES SOME ACTIVE ACTIVITIES AND ADLS

## 2023-07-10 ASSESSMENT — PAIN DESCRIPTION - DESCRIPTORS
DESCRIPTORS: SHARP
DESCRIPTORS: ACHING;THROBBING
DESCRIPTORS: ACHING

## 2023-07-10 ASSESSMENT — PAIN DESCRIPTION - ONSET
ONSET: ON-GOING
ONSET: GRADUAL

## 2023-07-10 NOTE — PROGRESS NOTES
Patient is alert and oriented x4. VSS on room air. Ambulating SBA with gait belt. Some complaints of back and chest pain today, MD notified and EKG completed. Pain managed per MAR. No side effects noted. Tolerating diet well. Voiding well via BRP. Patient is currently resting in bed with bed alarm on for safety. Call light within reach and all fall precautions in place.      Electronically signed by Martir Dean RN on 7/10/2023 at 7:54 PM

## 2023-07-10 NOTE — ED NOTES
Pt dc/d with instructions, rx's and work note in stable condition, ambulatory to lobby. Home per ride.      Khanh Silva RN  07/10/23 6120

## 2023-07-10 NOTE — ED NOTES
Transfer center called.   Waiting return call from [address] [facility], [address] [place] [address]  07/10/23 5634

## 2023-07-10 NOTE — ED NOTES
Waiting EMD, [facility] R[facility]ENS [facility] RN.  [address] [facility] FABY Jerry  07/10/23 2582

## 2023-07-10 NOTE — PLAN OF CARE
Problem: Pain  Goal: Verbalizes/displays adequate comfort level or baseline comfort level  Outcome: Progressing   Pt endorsing pain to 8. Being treated with PRN pain medication, rest, and frequent repositioning with pillow support for comfort and pressure relief. Pt reports some relief from pain with above interventions. Problem: Discharge Planning  Goal: Discharge to home or other facility with appropriate resources  Outcome: Progressing   Pt involved in discharge planning. Barriers to discharge discussed with patient. Discharge learning needs identified. Discuss with patient any additional needed resources and transportation plans. Case management following plan of care.
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
## 2023-07-10 NOTE — ED NOTES
Pt refusing to go to [facility]. Mother wants her to go to [facility]. EMD aware.      Juany Pino RN  07/10/23 8509

## 2023-07-10 NOTE — H&P
Nodules: No suspicious noncalcified pulmonary nodules. Pleura: No pleural effusions or significant pleural thickening. Heart and great vessels: Heart size is normal. Other mediastinal structures and lower neck: Soft tissue density anterior mediastinum likely represents residual thymic tissue. Adenopathy: Mildly enlarged right hilar lymph nodes, which may be reactive. Chest wall: No significant abnormality. Osseous structures: No significant abnormality. Upper abdomen: No additional abnormality. Bilateral posterior basilar segmental pulmonary emboli. No right heart strain. Patchy right basilar consolidation. In the setting of pulmonary emboli, pulmonary infarcts cannot be excluded, however atelectasis or pneumonia could have a similar appearance. Findings discussed with the ER physician at the time of this dictation. INCIDENTAL FINDINGS: None.         Electronically signed by Mariposa Brown MD on 7/10/2023 at 4:26 PM

## 2023-07-10 NOTE — ED NOTES
EMD speaking with pt., no beds at [facility] or [address], RN  07/10/23 7035

## 2023-07-10 NOTE — ED PROVIDER NOTES
Patient's care was signed out to me by the nighttime physician Dr. Nancy Villanueva. He initially saw the patient overnight with complaints of right-sided flank pain. Urinalysis was done which was consistent with UTI. She was prescribed antibiotics and was discharged home. She represented to him with complaints of increasing pain. At that point a abdominal CT was obtained to rule out intra-abdominal pathology and possible suspected kidney stone. Radiologist noted that she had an abnormal appearance at the right lung base and was concerned about PE versus possible pneumonia. CT pulmonary angiogram was then ordered. At this point it was change of shift and Dr. Nancy Villanueva requested I follow-up on the CT angiogram results. I received a call from the radiologist with the report of bilateral lower lobe pulmonary emboli. Question of possible right basilar infarct secondary to PE. Upon my reassessment of the patient she is very well-appearing. Vital signs are all stable blood pressure of 120/60. No tachypnea or hypoxia. She does report having pleuritic right-sided posterior chest pain and flank pain. She also complains of pain with movement. She has remained hemodynamically stable during her stay here in the ER without worsening symptoms. She denies shortness of breath. There is been no increase in pain. PE dosing of Lovenox administered 1.5 mg/kg. Patient will be admitted to [facility] for further management. Ultrasound not available here at this freestanding ER so lower extremity Dopplers were not ordered. We are also not able to order hypercoagulable panel from here. I have discussed this with the admitting hospitalist Kevan Prater who excepted care. Patient's risk for hypercoagulable state include being on birth control pills. She has had a history of 1 miscarriage. No immobilization. No known family history of hypercoagulability.       Disposition: Admit [facility]
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
## 2023-07-10 NOTE — ED NOTES
Pain c/o pain 1/10, does not request medicine. Aware transport on way. Resp easy. Lungs CTA.      Chery Grubbs RN  07/10/23 7374

## 2023-07-10 NOTE — PROGRESS NOTES
4 Eyes Skin Assessment     NAME:  Matty Zimmer OF BIRTH:  2000  MEDICAL RECORD NUMBER:  [id number]    The patient is being assessed for  Admission    I agree that at least one RN has performed a thorough Head to Toe Skin Assessment on the patient. ALL assessment sites listed below have been assessed. Areas assessed by both nurses:    Head, Face, Ears, Shoulders, Back, Chest, Arms, Elbows, Hands, Sacrum. Buttock, Coccyx, Ischium, Legs. Feet and Heels, and Under Medical Devices         Does the Patient have a Wound?  No noted wound(s)       Alen Prevention initiated by RN: No  Wound Care Orders initiated by RN: No    Pressure Injury (Stage 3,4, Unstageable, DTI, NWPT, and Complex wounds) if present, place Wound referral order by RN under : No    New Ostomies, if present place, Ostomy referral order under : No     Nurse 1 eSignature: Electronically signed by Dgao Gruber RN on 7/10/23 at 12:02 PM EDT    **SHARE this note so that the co-signing nurse can place an eSignature**    Nurse 2 eSignature: Electronically signed by Ozzie Salvador RN on 7/11/23 at 6:47 AM EDT

## 2023-07-11 ENCOUNTER — APPOINTMENT (OUTPATIENT)
Dept: VASCULAR LAB | Age: 23
DRG: 134 | End: 2023-07-11
Payer: COMMERCIAL

## 2023-07-11 LAB
BACTERIA UR CULT: NORMAL
C TRACH DNA CVX QL NAA+PROBE: NEGATIVE
EKG ATRIAL RATE: 95 BPM
EKG DIAGNOSIS: NORMAL
EKG P AXIS: 56 DEGREES
EKG P-R INTERVAL: 138 MS
EKG Q-T INTERVAL: 340 MS
EKG QRS DURATION: 78 MS
EKG QTC CALCULATION (BAZETT): 427 MS
EKG R AXIS: 72 DEGREES
EKG T AXIS: 66 DEGREES
EKG VENTRICULAR RATE: 95 BPM
N GONORRHOEA DNA CERV MUCUS QL NAA+PROBE: NEGATIVE

## 2023-07-11 PROCEDURE — 93010 ELECTROCARDIOGRAM REPORT: CPT | Performed by: INTERNAL MEDICINE

## 2023-07-11 PROCEDURE — 6370000000 HC RX 637 (ALT 250 FOR IP): Performed by: INTERNAL MEDICINE

## 2023-07-11 PROCEDURE — 36415 COLL VENOUS BLD VENIPUNCTURE: CPT

## 2023-07-11 PROCEDURE — 2580000003 HC RX 258: Performed by: INTERNAL MEDICINE

## 2023-07-11 PROCEDURE — 85635 REPTILASE TEST: CPT

## 2023-07-11 PROCEDURE — 6360000002 HC RX W HCPCS: Performed by: INTERNAL MEDICINE

## 2023-07-11 PROCEDURE — 85730 THROMBOPLASTIN TIME PARTIAL: CPT

## 2023-07-11 PROCEDURE — 1200000000 HC SEMI PRIVATE

## 2023-07-11 PROCEDURE — 85670 THROMBIN TIME PLASMA: CPT

## 2023-07-11 PROCEDURE — 93970 EXTREMITY STUDY: CPT

## 2023-07-11 RX ORDER — HYDROCODONE BITARTRATE AND ACETAMINOPHEN 5; 325 MG/1; MG/1
1 TABLET ORAL EVERY 6 HOURS PRN
Status: DISCONTINUED | OUTPATIENT
Start: 2023-07-11 | End: 2023-07-12 | Stop reason: HOSPADM

## 2023-07-11 RX ORDER — MORPHINE SULFATE 2 MG/ML
2 INJECTION, SOLUTION INTRAMUSCULAR; INTRAVENOUS EVERY 4 HOURS PRN
Status: DISCONTINUED | OUTPATIENT
Start: 2023-07-11 | End: 2023-07-12 | Stop reason: HOSPADM

## 2023-07-11 RX ADMIN — ENOXAPARIN SODIUM 70 MG: 100 INJECTION SUBCUTANEOUS at 11:21

## 2023-07-11 RX ADMIN — KETOROLAC TROMETHAMINE 30 MG: 30 INJECTION, SOLUTION INTRAMUSCULAR; INTRAVENOUS at 11:21

## 2023-07-11 RX ADMIN — ENOXAPARIN SODIUM 70 MG: 100 INJECTION SUBCUTANEOUS at 21:27

## 2023-07-11 RX ADMIN — ACETAMINOPHEN 650 MG: 325 TABLET ORAL at 21:31

## 2023-07-11 RX ADMIN — SODIUM CHLORIDE, PRESERVATIVE FREE 10 ML: 5 INJECTION INTRAVENOUS at 21:27

## 2023-07-11 ASSESSMENT — ENCOUNTER SYMPTOMS
COLOR CHANGE: 0
NAUSEA: 0
COUGH: 0
SINUS PAIN: 0
SHORTNESS OF BREATH: 1
CONSTIPATION: 0
ABDOMINAL PAIN: 0
SORE THROAT: 0
DIARRHEA: 0
WHEEZING: 0
VOMITING: 0
SINUS PRESSURE: 0
BACK PAIN: 0

## 2023-07-11 ASSESSMENT — PAIN DESCRIPTION - ONSET
ONSET: PROGRESSIVE
ONSET: GRADUAL

## 2023-07-11 ASSESSMENT — PAIN DESCRIPTION - DESCRIPTORS
DESCRIPTORS: ACHING;THROBBING
DESCRIPTORS: ACHING;DISCOMFORT

## 2023-07-11 ASSESSMENT — PAIN DESCRIPTION - LOCATION
LOCATION: BACK
LOCATION: BACK

## 2023-07-11 ASSESSMENT — PAIN SCALES - GENERAL
PAINLEVEL_OUTOF10: 0
PAINLEVEL_OUTOF10: 3
PAINLEVEL_OUTOF10: 7
PAINLEVEL_OUTOF10: 0
PAINLEVEL_OUTOF10: 1

## 2023-07-11 ASSESSMENT — PAIN - FUNCTIONAL ASSESSMENT
PAIN_FUNCTIONAL_ASSESSMENT: PREVENTS OR INTERFERES SOME ACTIVE ACTIVITIES AND ADLS
PAIN_FUNCTIONAL_ASSESSMENT: ACTIVITIES ARE NOT PREVENTED

## 2023-07-11 ASSESSMENT — PAIN DESCRIPTION - PAIN TYPE
TYPE: ACUTE PAIN
TYPE: ACUTE PAIN

## 2023-07-11 ASSESSMENT — PAIN DESCRIPTION - ORIENTATION
ORIENTATION: MID;RIGHT
ORIENTATION: MID

## 2023-07-11 ASSESSMENT — PAIN DESCRIPTION - FREQUENCY
FREQUENCY: INTERMITTENT
FREQUENCY: INTERMITTENT

## 2023-07-11 NOTE — PLAN OF CARE
Problem: Pain  Goal: Verbalizes/displays adequate comfort level or baseline comfort level  7/11/2023 0043 by Wing Niecy RN  Outcome: Progressing  Flowsheets (Taken 7/11/2023 0043)  Verbalizes/displays adequate comfort level or baseline comfort level:   Encourage patient to monitor pain and request assistance   Assess pain using appropriate pain scale   Administer analgesics based on type and severity of pain and evaluate response   Implement non-pharmacological measures as appropriate and evaluate response   Consider cultural and social influences on pain and pain management  Note: Pain assessed using 0-10 pain scale. Managed well with PRN medications.         Problem: Discharge Planning  Goal: Discharge to home or other facility with appropriate resources  7/11/2023 0043 by Wing Niecy RN  Outcome: Progressing  Flowsheets (Taken 7/11/2023 0043)  Discharge to home or other facility with appropriate resources: Identify barriers to discharge with patient and caregiver

## 2023-07-11 NOTE — PLAN OF CARE
Problem: Pain  Goal: Verbalizes/displays adequate comfort level or baseline comfort level  7/11/2023 0749 by Sergio Zaldivar RN  Outcome: Progressing  Pt endorsing pain to back. Being treated with PRN pain medication, rest, and frequent repositioning with pillow support for comfort and pressure relief. Pt reports some relief from pain with above interventions.       Problem: Discharge Planning  Goal: Discharge to home or other facility with appropriate resources  7/11/2023 0749 by Sergio Zaldivar RN  Outcome: Progressing  Case management following for discharge needs/ plans

## 2023-07-11 NOTE — PROGRESS NOTES
Pt A&O, VSS. Pt complains of intermittent pain which has been managed per mar. No acute changes noted this shift. Pt is tolerating ambulation well x1 SBA. Voiding adequately via BRP. Tolerating PO diet and fluids well. Pt is currently resting in bed with all fall and safety precautions in place, bed locked and in lowest position, call light and belongings within reach. Pt denies further needs at this time.

## 2023-07-11 NOTE — PROGRESS NOTES
No acute issues overnight. PRN toradol given X1 for c/o 7/10 pain to back. Patient satisfied and pain relieved after medication given. VSS on room air. Up standby to bathroom. Steady on feet when ambulating. Voiding adequately. IVF infusing per the [facility] NSR on tele. All fall precautions and safety measures are in place. Bed alarm on, bed locked and in lowest position, call light within reach. Patient understands to call out for assistance when ambulating. Will continue to monitor closely.

## 2023-07-11 NOTE — CARE COORDINATION
CM Note: CM met with pt and father at bedside. Pt is from home with her son and is completely independent pta. Pt is a self- and has a ride home at discharge. No CM needs anticipated for discharge.   Electronically signed by MARCK Mackey on 7/11/2023 at 3:40 PM  [phone]

## 2023-07-11 NOTE — PROGRESS NOTES
V2.0  [facility] Hospitalist Progress Note      Name:  Analia Bailon /Age/Sex: 2000  (20 y.o. female)   MRN & CSN:  [id number] & [id number] Encounter Date/Time: 2023 12:21 PM EDT    Location:  [facility]- PCP: No primary care provider on file. Hospital Day: 2    Assessment and Plan:   Analia Bailon is a 20 y.o. female with no past significant medical history who presents with Pulmonary embolism, bilateral ([address])      Plan:    Acute pulmonary embolism  Likely pulmonary infarct  Pleuritic chest pain  CTA showed Bilateral posterior basilar segmental pulmonary emboli. No right heart strain. Also some patchy right basilar findings that could be consistent with pulmonary infarct. She does have pleuritic chest pain which would be consistent with this. Pneumonia cannot be completely excluded, but given the presentation be extremely unlikely. Patient has never had a blood clot before and does not know anyone who has had one in her family. Norco plus IV morphine for breakthrough for pain. Full dose Lovenox  Hematology consulted       Diet ADULT DIET;  Regular   DVT Prophylaxis [x] Lovenox, []  Heparin, [] SCDs, [] Ambulation,    [] Eliquis, [] Xarelto  [] Coumadin [] other   Code Status Full Code   Disposition From: Home  Expected Disposition: Home  Estimated Date of Discharge: Likely tomorrow  Patient requires continued admission due to hematology evaluation and clearance for discharge   Surrogate Decision Maker/ POA      Personally reviewed Lab Studies and Imaging     Discussed management of the case with case management who recommended discharge to home when medically cleared    EKG interpreted personally and results sinus    Imaging that was interpreted personally includes CTA and results acute pulmonary embolisms      Subjective:     Chief Complaint: Flank Pain (tonight) and Nausea     Patient continues to endorse pleuritic type chest pain especially with deep breathing in the area of her PE and

## 2023-07-11 NOTE — ADT AUTH CERT
Subscriber Details  Hospital Account [de-identified]  CVG Subscriber Name/Sex/Relation Subscriber  Subscriber Address/Phone Subscriber Emp/Emp Phone   1. [facility]   [id number] [facility] - Female   (Self) 2000 [address] [address]   [phone](Q) ROBERT'S      Utilization Reviews       PA REC by Branden Farr RN       Review Status Review Entered   In Primary 2023 6702       Created By   Branden Farr RN      Criteria Review   slr keep in  We recommend that the following pt's current hospitalization under INPATIENT status is APPROPRIATE . Name: Britany Velez   : 2000   CSN: [id number]   INSURANCE: [facility]        Clinical summary    Plan:  1. Pleuritic chest pain worse on the right side due to acute pulmonary embolism  -Unsure what trigger is however patient is birth control pills and could have caused it  -CTA showed-Bilateral posterior basilar segmental pulmonary emboli. No right heart strain. Patchy right basilar consolidation. In the setting of pulmonary emboli,pulmonary infarcts cannot be excluded, however atelectasis or pneumonia could have a similar appearance   -will get ultrasound of the lower extremity to r/o DVT, vital signs are stable, on room air and not hypoxic.  -will give IV Solu-Medrol 60 mg now for pleuritic chest pain, start IV Toradol prn/IV morphine for pain  -Received Lovenox at 1.5 mg @ transferring center, will continue Lovenox at 1 mg/kg Q12H, IVF  -Since pneumonia cannot be ruled out versus infarct, will start IV ceftriaxone gram daily for now.   -Discussed with patient, sibling and mother, questions/concerns were addressed.      Vitals vss  Labs and Imaging reviewed  MCG criteria applies yes,   Comments Inpt remberto      This chart was reviewed at 1:23 PM 2023    [address] [facility]            7/10 initial by Branden Farr RN       Review Status Review Entered   In Primary 2023 1301       Created By

## 2023-07-12 VITALS
HEIGHT: 65 IN | HEART RATE: 74 BPM | RESPIRATION RATE: 14 BRPM | OXYGEN SATURATION: 98 % | WEIGHT: 157 LBS | TEMPERATURE: 98.4 F | DIASTOLIC BLOOD PRESSURE: 73 MMHG | SYSTOLIC BLOOD PRESSURE: 117 MMHG | BODY MASS INDEX: 26.16 KG/M2

## 2023-07-12 LAB
ANION GAP SERPL CALCULATED.3IONS-SCNC: 10 MMOL/L (ref 3–16)
BASOPHILS # BLD: 0 K/UL (ref 0–0.2)
BASOPHILS NFR BLD: 0.5 %
BUN SERPL-MCNC: 12 MG/DL (ref 7–20)
CALCIUM SERPL-MCNC: 8.5 MG/DL (ref 8.3–10.6)
CHLORIDE SERPL-SCNC: 109 MMOL/L (ref 99–110)
CO2 SERPL-SCNC: 21 MMOL/L (ref 21–32)
CREAT SERPL-MCNC: 0.6 MG/DL (ref 0.6–1.1)
DEPRECATED RDW RBC AUTO: 15.2 % (ref 12.4–15.4)
EOSINOPHIL # BLD: 0.1 K/UL (ref 0–0.6)
EOSINOPHIL NFR BLD: 1.6 %
GFR SERPLBLD CREATININE-BSD FMLA CKD-EPI: >60 ML/MIN/{1.73_M2}
GLUCOSE SERPL-MCNC: 87 MG/DL (ref 70–99)
HCT VFR BLD AUTO: 33.4 % (ref 36–48)
HGB BLD-MCNC: 10.7 G/DL (ref 12–16)
LYMPHOCYTES # BLD: 3.2 K/UL (ref 1–5.1)
LYMPHOCYTES NFR BLD: 60 %
MCH RBC QN AUTO: 22.6 PG (ref 26–34)
MCHC RBC AUTO-ENTMCNC: 32.2 G/DL (ref 31–36)
MCV RBC AUTO: 70.4 FL (ref 80–100)
MONOCYTES # BLD: 0.3 K/UL (ref 0–1.3)
MONOCYTES NFR BLD: 6.5 %
NEUTROPHILS # BLD: 1.7 K/UL (ref 1.7–7.7)
NEUTROPHILS NFR BLD: 31.4 %
PLATELET # BLD AUTO: 200 K/UL (ref 135–450)
PMV BLD AUTO: 9.6 FL (ref 5–10.5)
POTASSIUM SERPL-SCNC: 4.2 MMOL/L (ref 3.5–5.1)
RBC # BLD AUTO: 4.75 M/UL (ref 4–5.2)
SODIUM SERPL-SCNC: 140 MMOL/L (ref 136–145)
WBC # BLD AUTO: 5.3 K/UL (ref 4–11)

## 2023-07-12 PROCEDURE — 85025 COMPLETE CBC W/AUTO DIFF WBC: CPT

## 2023-07-12 PROCEDURE — 6360000002 HC RX W HCPCS: Performed by: INTERNAL MEDICINE

## 2023-07-12 PROCEDURE — 2580000003 HC RX 258: Performed by: INTERNAL MEDICINE

## 2023-07-12 PROCEDURE — 36415 COLL VENOUS BLD VENIPUNCTURE: CPT

## 2023-07-12 PROCEDURE — 80048 BASIC METABOLIC PNL TOTAL CA: CPT

## 2023-07-12 RX ORDER — HYDROCODONE BITARTRATE AND ACETAMINOPHEN 5; 325 MG/1; MG/1
1 TABLET ORAL EVERY 6 HOURS PRN
Qty: 12 TABLET | Refills: 0 | Status: SHIPPED | OUTPATIENT
Start: 2023-07-12 | End: 2023-07-15

## 2023-07-12 RX ADMIN — ENOXAPARIN SODIUM 70 MG: 100 INJECTION SUBCUTANEOUS at 09:38

## 2023-07-12 RX ADMIN — SODIUM CHLORIDE, PRESERVATIVE FREE 10 ML: 5 INJECTION INTRAVENOUS at 09:41

## 2023-07-12 ASSESSMENT — PAIN SCALES - GENERAL: PAINLEVEL_OUTOF10: 0

## 2023-07-12 NOTE — PLAN OF CARE
Pain  Goal: Verbalizes/displays adequate comfort level or baseline comfort level  Outcome: Progressing  Note: PT denies having L rib pain at this time. Discharge Planning  Goal: Discharge to home or other facility with appropriate resources  Outcome: Progressing     ABCDS Injury Assessment  Goal: Absence of physical injury  Outcome: Progressing  Note: PT ambulating with no assistance. She does not report having dizziness.

## 2023-07-12 NOTE — DISCHARGE SUMMARY
V2.0  Discharge Summary    Name:  Britany Velez /Age/Sex: 2000 (20 y.o. female)   Admit Date: 7/10/2023  Discharge Date: 23    MRN & CSN:  [id number] & [id number] Encounter Date and Time 23 12:11 PM EDT    Attending:  Braden Joshi, * Discharging Provider: Braden Joshi MD       Hospital Course:     Brief HPI: Britany Velez is a 20 y.o. female  with no significant past medical history who presents with complaint of chest pain, worse with inspiration, worse on the right side, started 2 days ago and progressively getting worse, denies any trauma to the chest, fevers or chills, palpitation, headache or blurry vision, abdominal pain, nausea vomiting, change in urinary or bowel habits. At [facility] patient had a CT done which showed pulmonary embolism, received Lovenox and subsequently transferred here for further care. Patient was not hypoxic, and was hemodynamically stable    Brief Problem Based Course:     Acute pulmonary embolism  Likely pulmonary infarct  Pleuritic chest pain  CTA showed Bilateral posterior basilar segmental pulmonary emboli. No right heart strain. Also some patchy right basilar findings that could be consistent with pulmonary infarct. She does have pleuritic chest pain which would be consistent with this. Pneumonia cannot be completely excluded, but given the presentation be extremely unlikely. Patient has never had a blood clot before and does not know anyone who has had one in her family. Hypercoagulability panel sent in house. Patient to be continued on Eliquis twice daily as a blood thinner with plans to follow-up with hematology for results of her hypercoagulability work-up. Likely will need to be on a blood thinner for at least 6 months. Recommend cessation of utilizing OCPs and to talk to her primary care doctor for another follow-up birth control such as an IUD.         The patient expressed appropriate understanding of, and agreement

## 2023-07-12 NOTE — PROGRESS NOTES
No acute issues overnight, patient slept well. Pain managed with tylenol. Denies shortness of breath, all VSS on room air. Ambulating well SBA to bathroom. All fall precautions and safey measures are in place. Bed locked in lowest position. Call light & over bed table are both within reach. Awaiting for hematology to see pt today. Patient denies any further needs at this time. Will continue to monitor.

## 2023-07-12 NOTE — CONSULTS
!  +------------------------+------+------+------------+  ! Femoral                 !Phasic! No    !            !  +------------------------+------+------+------------+  ! Deep Femoral            !Phasic! No    !            !  +------------------------+------+------+------------+  ! Popliteal               !Phasic! No    !            !  +------------------------+------+------+------------+      Problem List  Patient Active Problem List   Diagnosis    Pelvic pain affecting pregnancy in second trimester, antepartum    Threatened labor    Vaginal delivery    Closed displaced oblique fracture of shaft of left ulna    Pulmonary embolism, bilateral ([address])    Pulmonary embolism and infarction [facility])       IMPRESSION/RECOMMENDATIONS:    Acute pulmonary embolism  Likely pulmonary infarct  Pleuritic chest pain    -Presented with R flank pain, +UTI  -CT scan completed given worsening symptoms which revealed bilateral posterior basilar segmental pulmonary emboli and likely pulmonary infarct. No R heart strain.   -Currently being treated with Lovenox injections  - risk factors include birth control pills, tobacco use   - hypercoag workup ordered by Hospitalist   - Typically, in a first time clotting event, recommend 6 month course of anticoagulation   - she will be given an appointment to follow up with Dr. Tamela Galindo as an outpatient     Thank you for asking me to see the patient. FAISLA Mcginnis - CNP  Please Contact Through [employer]     55 mins total time was spent on today's visit which includes chart review, discussion with Hospitalist, my exam of the patient, coordination of outpatient appointment, and documentation of this encounter. Tamela Galindo MD, [facility]  Oncology Hematology Care [facility])  Hematology, BMT, Cellular therapy    Thank you again for this consult. We will follow.

## 2023-07-12 NOTE — PLAN OF CARE
Problem: Pain  Goal: Verbalizes/displays adequate comfort level or baseline comfort level  Outcome: Progressing  Flowsheets (Taken 7/12/2023 0046)  Verbalizes/displays adequate comfort level or baseline comfort level:   Encourage patient to monitor pain and request assistance   Assess pain using appropriate pain scale   Administer analgesics based on type and severity of pain and evaluate response   Implement non-pharmacological measures as appropriate and evaluate response     Problem: Discharge Planning  Goal: Discharge to home or other facility with appropriate resources  Outcome: Progressing  Flowsheets (Taken 7/12/2023 0046)  Discharge to home or other facility with appropriate resources: Identify barriers to discharge with patient and caregiver

## 2023-07-12 NOTE — DISCHARGE INSTRUCTIONS
Please make sure to take your blood thinner twice daily. Please make sure to follow-up with the hematologist.  Jacqueline Reece will likely need to take the blood thinner for approximately 6 months. Please discuss with your primary care physician alternative means of birth control. Please avoid using NSAIDs [such as ibuprofen, Aleve, Advil, and naproxen] for pain as this can lead to more bleeding/bruising. Tylenol is safe. (10/12) WBC 11.06 H, Hbg 11.2 L, Cl 97 L, BUN 33 H, Glu 104 H;      (10/11) HbA1c 6.1% H

## 2023-07-12 NOTE — DISCHARGE INSTR - COC
Continuity of Care Form    Patient Name: Tere Messer   :  2000  MRN:  [id number]    Admit date:  7/10/2023  Discharge date:  ***    Code Status Order: Full Code   Advance Directives:     Admitting Physician:  Talia Xiong MD  PCP: No primary care provider on file.     Discharging Nurse: [facility] Unit/Room#: 5501/5501-01  Discharging Unit Phone Number: ***    Emergency Contact:   Extended Emergency Contact Information  Primary Emergency Contact: Shaggy Mcgarry  Address: [address] [place] [address], [facility] [address] [facility] of [address] [place] Phone: [phone]  Mobile Phone: [phone]  Relation: Parent  Secondary Emergency Contact: [address] Phone: [phone]  Mobile Phone: [phone]  Relation: Parent    Past Surgical History:  Past Surgical History:   Procedure Laterality Date    FOREARM SURGERY Left 2020    OPEN REDUCTION INTERNAL FIXATION LEFT ULNA SHAFT FRACTURE performed by Wai Smith MD at [facility]       Immunization History:   Immunization History   Administered Date(s) Administered    COVID-19, PFIZER Bivalent, DO NOT Dilute, (age 12y+), IM, 30 mcg/0.3 mL 2023    TDaP, ADACEL (age 6y-58y), [facility] (age 10y+), IM, 0.5mL 2019       Active Problems:  Patient Active Problem List   Diagnosis Code    Pelvic pain affecting pregnancy in second trimester, antepartum O26.892, R10.2    Threatened labor O47.9    Vaginal delivery O80    Closed displaced oblique fracture of shaft of left ulna S52.232A    Pulmonary embolism, bilateral (HCC) I26.99    Pulmonary embolism and infarction ([address]) I26.99       Isolation/Infection:   Isolation            No Isolation          Patient Infection Status       Infection Onset Added Last Indicated Last Indicated By Review Planned Expiration Resolved Resolved By    None active    Resolved    COVID-19 (Rule Out) 20 COVID-19 (Ordered)   20 Rule-Out Test Resulted            Nurse

## 2023-07-12 NOTE — CARE COORDINATION
Case Management Assessment            Discharge Note                    Date / Time of Note: 7/12/2023 12:52 PM                  Discharge Note Completed by: MARCK Lorenzo    Patient Name: Edwin Godinez   YOB: 2000  Diagnosis: Bilateral pulmonary embolism ([address]) [I26.99]  Pulmonary embolism, bilateral ([address]) [I26.99]  Pulmonary embolism and infarction ([address]) [I26.99]   Date / Time: 7/10/2023  5:03 AM    Current PCP: No primary care provider on file. Clinic patient: No    Hospitalization in the last 30 days: No       Advance Directives:  Code Status: Full Code  [place] DNR form completed and on chart: Not Indicated    Financial:  Payor: Annamaria Weaver / Plan: Wilmer Goodman / Product Type: *No Product type* /      Pharmacy:  No Pharmacies [address] [place] [address] medications?:    Assistance provided by Case Management: None at this time    Does patient want to participate in local refill/ meds to beds program?:      Meds To Beds General Rules:  1. Can ONLY be done Monday- Friday between 8:30am-5pm  2. Prescription(s) must be in pharmacy by 3pm to be filled same day  3. Copy of patient's insurance/ prescription drug card and patient face sheet must be sent along with the prescription(s)  4. Cost of Rx cannot be added to hospital bill. If financial assistance is needed, please contact unit  or ;  or  CANNOT provide pharmacy voucher for patients co-pays  5.  Patients can then  the prescription on their way out of the hospital at discharge, or pharmacy can deliver to the bedside if staff is available. (payment due at time of pick-up or delivery - cash, check, or card accepted)     Able to afford home medications/ co-pay costs: Yes    ADLS:  Current PT AM-PAC Score:   /24  Current OT AM-PAC Score:   /24      DISCHARGE Disposition: Home- No Services Needed    LOC at discharge: Not Applicable  LUCIANO Completed: Not

## 2023-07-12 NOTE — PROGRESS NOTES
PT was discharged with all personal belongings. We reviewed her discharging paperwork and instructions as well as precautions/side effects when taking Eliquis. All questions were answered.

## 2023-07-15 LAB
APCR PPP: 4.17 {RATIO}
APTT IMM NP PPP: ABNORMAL SEC (ref 32–48)
APTT P HEP NEUT PPP: 40 SEC (ref 32–48)
AT III ACT/NOR PPP CHRO: 104 % (ref 76–128)
B2 GLYCOPROT1 IGG SERPL IA-ACNC: <10 SGU
B2 GLYCOPROT1 IGM SERPL IA-ACNC: <10 SMU
CARDIOLIPIN IGG SER IA-ACNC: <10 GPL
CARDIOLIPIN IGM SER IA-ACNC: 11 MPL
CONFIRM APTT STACLOT: ABNORMAL
DRVVT SCREEN TO CONFIRM RATIO: ABNORMAL RATIO
F2 C.20210G>A GENO BLD/T: NEGATIVE
F5 P.R506Q BLD/T QL: ABNORMAL
HCYS SERPL-SCNC: 8 UMOL/L (ref 0–15)
LA 3 SCREEN W REFLEX-IMP: ABNORMAL
LA NT DPL PPP QL: ABNORMAL
MIXING DRVVT: ABNORMAL SEC (ref 33–44)
PROT C ACT/NOR PPP: 141 % (ref 83–168)
PROT S FREE AG ACT/NOR PPP IA: 49 % (ref 55–123)
PROTHROMBIN TIME: 14.1 SEC (ref 12–15.5)
REPTILASE TIME: 14.9 SEC
SCREEN APTT: 59 SEC (ref 32–48)
SCREEN DRVVT: 43 SEC (ref 33–44)
SPECIMEN SOURCE: ABNORMAL
SPECIMEN SOURCE: ABNORMAL
THROMBIN TIME: 29.1 SEC (ref 14.7–19.5)
THROMBOSIS INTERPRETATION: ABNORMAL

## 2024-03-15 LAB
BV DNA PNL VAG NAA+PROBE: DETECTED
CANDIDA 6 SPECIES PROFILE, NAA: DETECTED
CANDIDA GLABRATA: NOT DETECTED
CANDIDA KRUSEI: NOT DETECTED
CHLAMYDIA TRACHOMATIS AMPLIFIED DET: NOT DETECTED
HB: SOURCE: NORMAL
N GONORRHOEAE AMPLIFIED DET: NOT DETECTED
SPECIMEN TYPE: NORMAL
VAGINAL PATHOGENS DNA PANEL: NOT DETECTED

## (undated) DEVICE — GLOVE SURG SZ 6 L12IN FNGR THK87MIL DK GRN LTX FREE ISOLEX

## (undated) DEVICE — CANISTER, RIGID, 1200CC: Brand: MEDLINE INDUSTRIES, INC.

## (undated) DEVICE — PENCIL ES L3M BTTN SWCH S STL HEX LOK BLDE ELECTRD HOLSTER

## (undated) DEVICE — Z DISCONTINUED PER MEDLINE (LOW STOCK)  USE 2422770 DRAPE C ARM W54XL78IN FOR FLROSCN

## (undated) DEVICE — GOWN SIRUS NONREIN LG W/TWL: Brand: MEDLINE INDUSTRIES, INC.

## (undated) DEVICE — DRILL, AO, SCALED: Brand: VARIAX

## (undated) DEVICE — STRIP,CLOSURE,WOUND,MEDI-STRIP,1/2X4: Brand: MEDLINE

## (undated) DEVICE — NEEDLE HYPO 25GA L1.5IN BVL ORIENTED ECLIPSE

## (undated) DEVICE — GOWN SIRUS NONREIN XL W/TWL: Brand: MEDLINE INDUSTRIES, INC.

## (undated) DEVICE — SOLUTION IV IRRIG POUR BRL 0.9% SODIUM CHL 2F7124

## (undated) DEVICE — UNTHREADED GUIDE WIRE: Brand: FIXOS

## (undated) DEVICE — DRAPE HND W114XL142IN BLU POLYPR W O PCH FEN CRD AND TB HLDR

## (undated) DEVICE — BANDAGE COMPR W4INXL12FT E DISP ESMARCH EVEN

## (undated) DEVICE — Z DISCONTINUED USE 2275686 GLOVE SURG SZ 8 L12IN FNGR THK13MIL WHT ISOLEX POLYISOPRENE

## (undated) DEVICE — SHEET,DRAPE,53X77,STERILE: Brand: MEDLINE

## (undated) DEVICE — MERCY HEALTH WEST TURNOVER: Brand: MEDLINE INDUSTRIES, INC.

## (undated) DEVICE — ZIMMER® STERILE DISPOSABLE TOURNIQUET CUFF WITH PLC, DUAL PORT, SINGLE BLADDER, 18 IN. (46 CM)

## (undated) DEVICE — TOWEL,OR,DSP,ST,BLUE,STD,4/PK,20PK/CS: Brand: MEDLINE

## (undated) DEVICE — DRAPE,U/SHT,SPLIT,FILM,60X84,STERILE: Brand: MEDLINE

## (undated) DEVICE — GLOVE SURG SZ 6 CRM LTX FREE POLYISOPRENE POLYMER BEAD ANTI

## (undated) DEVICE — SYRINGE IRRIG 60ML SFT PLIABLE BLB EZ TO GRP 1 HND USE W/

## (undated) DEVICE — GLOVE SURG SZ 8 CRM LTX FREE POLYISOPRENE POLYMER BEAD ANTI

## (undated) DEVICE — MINOR SET UP PK

## (undated) DEVICE — COVER LT HNDL BLU PLAS

## (undated) DEVICE — INTENDED FOR TISSUE SEPARATION, AND OTHER PROCEDURES THAT REQUIRE A SHARP SURGICAL BLADE TO PUNCTURE OR CUT.: Brand: BARD-PARKER ® STAINLESS STEEL BLADES

## (undated) DEVICE — 3M™ STERI-STRIP™ COMPOUND BENZOIN TINCTURE 40 BAGS/CARTON 4 CARTONS/CASE C1544: Brand: 3M™ STERI-STRIP™

## (undated) DEVICE — PADDING UNDERCAST W4INXL4YD 100% COT CRIMPED FINISH WBRL II

## (undated) DEVICE — SUTURE VCRL SZ 3-0 L18IN ABSRB UD L26MM SH 1/2 CIR J864D

## (undated) DEVICE — DRESSING,GAUZE,XEROFORM,CURAD,1"X8",ST: Brand: CURAD

## (undated) DEVICE — CHLORAPREP 26ML ORANGE

## (undated) DEVICE — COUNTERSINK, AO
Type: IMPLANTABLE DEVICE | Site: ULNA | Status: NON-FUNCTIONAL
Brand: VARIAX
Removed: 2020-08-24

## (undated) DEVICE — SPONGE GZ W4XL4IN COT 12 PLY TYP VII WVN C FLD DSGN

## (undated) DEVICE — TUBING, SUCTION, 1/4" X 12', STRAIGHT: Brand: MEDLINE